# Patient Record
Sex: MALE | URBAN - METROPOLITAN AREA
[De-identification: names, ages, dates, MRNs, and addresses within clinical notes are randomized per-mention and may not be internally consistent; named-entity substitution may affect disease eponyms.]

---

## 2019-08-09 ENCOUNTER — TELEPHONE (OUTPATIENT)
Dept: TRANSPLANT | Facility: CLINIC | Age: 59
End: 2019-08-09

## 2019-08-09 NOTE — TELEPHONE ENCOUNTER
----- Message from Izzy Gaytan sent at 8/9/2019 11:12 AM CDT -----  Regarding: Cirrhosis and Hepatocellular carcinoma referral  Patient being referred for Cirrhosis and Hepatocellular carcinoma. Referral and records scanned into .    Thanks!  Izzy

## 2019-08-14 ENCOUNTER — TELEPHONE (OUTPATIENT)
Dept: TRANSPLANT | Facility: CLINIC | Age: 59
End: 2019-08-14

## 2019-08-14 NOTE — TELEPHONE ENCOUNTER
----- Message from Ingris Townsend sent at 8/14/2019  9:52 AM CDT -----    Called pt sindy [place] office at [phone] and told them that we only have recs on the pt lung mass, nothing about his liver cancer. She will fax recs to [phone]

## 2019-08-21 ENCOUNTER — TELEPHONE (OUTPATIENT)
Dept: TRANSPLANT | Facility: CLINIC | Age: 59
End: 2019-08-21

## 2019-08-21 DIAGNOSIS — K74.69 COMPENSATED HCV CIRRHOSIS: ICD-10-CM

## 2019-08-21 DIAGNOSIS — B19.20 COMPENSATED HCV CIRRHOSIS: ICD-10-CM

## 2019-08-21 NOTE — TELEPHONE ENCOUNTER
Referral received from Dr aTcho Chapman/     Patient with HCV Cirrhosis with abnormal imaging.   ICD-10:  B19.20, K74.69  Referred for liver transplant for CONSULT.    Referral completed and forwarded to [facility].          Insurance:   PRIMARY: Medicare [employer]   ID:[id number]  Contact #     Referring   Dr Tacho Chapman  [facility]  [address] [place] [id number]  PH:  [phone]  Fax [phone]

## 2019-08-21 NOTE — PLAN OF CARE
" (Physician in Lead of Transfers)   Outside Transfer Acceptance Note / Regional Referral Center    Transferring Physician: Dr. Richards, ED    Accepting Physician: Noman Moya MD    Date of Acceptance: 08/21/2019    Transferring Facility: [facility] ED    Reason for Transfer: Higher level of care, hepatology    Report from Transferring Physician/Hospital course:     Mr. 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 His symptoms have been present since February but slowly worsening, though more acutely in the last several days with significantly worsening skin yellowing, distension, and weakness, his most debilitating symptom. No altered mental status. No fevers, chills, or night sweats. He has abdominal pain, but dependent and thought to be from distension. Per Dr. Richards, that patient has no PCP and has has been largely followed up by surgery who ordered a CT A/P in late July that showed multiple hepatic masses, with follow-up MRI showing probable hepatocellular carcinoma on 7/29. Throughout this period he has been attempting to get an appointment with [facility] hepatology. He reports causal alcohol use, drinking "a few beers in the evening," most recently 2.5 weeks ago.    Workup in the ED was significant for profound liver abnormalities (see lab workup below). Transfer was requested for hepatology evaluation. Dr. Bar reviewed and agreed to consult for decompensated cirrhosis.    VS:     Temp: 98.0  HR: 91  RR: 17  SpO2: 94% on RA  BP: 141/78    Labs:     Hemoglobin 12.1  WBC 6.98k  Plt 120k    Na 128  K 3.7  Bicarb 30  sCr 0.17  Total bili 21.8  AST 79    INR 2.  CPK 57  Amylase/lipase 66 and 20  Troponin normal    Radiographs: as above    To Do List:     1. Consult hepatology  2. CMP + INR for MELD determination  3. Diagnostic paracentesis to " rule-out SBP if safe pocket identified on US    Upon patient arrival to floor, please call extension [phone] for Hospital Medicine admit team assignment and for additional admit orders for the patient.  Do not page the attending physician associated with the patient on arrival (this physician may not be on duty at the time of arrival).  Rather, always call [phone] to reach the triage physician for orders and team assignment.    Noman Moya M.D.  Department of Hospital Medicine  [facility]  [phone] (pager)

## 2019-08-22 ENCOUNTER — HOSPITAL ENCOUNTER (INPATIENT)
Facility: HOSPITAL | Age: 59
LOS: 2 days | Discharge: HOSPICE | DRG: 432 | End: 2019-08-24
Attending: INTERNAL MEDICINE | Admitting: HOSPITALIST
Payer: MEDICARE

## 2019-08-22 DIAGNOSIS — Z71.89 GOALS OF CARE, COUNSELING/DISCUSSION: ICD-10-CM

## 2019-08-22 DIAGNOSIS — Z51.5 PALLIATIVE CARE ENCOUNTER: ICD-10-CM

## 2019-08-22 DIAGNOSIS — K72.90 LIVER FAILURE: ICD-10-CM

## 2019-08-22 PROBLEM — B19.20 DECOMPENSATED CIRRHOSIS RELATED TO HEPATITIS C VIRUS (HCV): Status: ACTIVE | Noted: 2019-08-22

## 2019-08-22 PROBLEM — K70.31 ALCOHOLIC CIRRHOSIS OF LIVER WITH ASCITES: Status: ACTIVE | Noted: 2019-08-22

## 2019-08-22 PROBLEM — K74.69 DECOMPENSATED CIRRHOSIS RELATED TO HEPATITIS C VIRUS (HCV): Status: ACTIVE | Noted: 2019-08-22

## 2019-08-22 PROBLEM — C22.0 HCC (HEPATOCELLULAR CARCINOMA): Status: ACTIVE | Noted: 2019-08-22

## 2019-08-22 LAB
AFP SERPL-MCNC: ABNORMAL NG/ML (ref 0–8.4)
ALBUMIN FLD-MCNC: 0.8 G/DL
ALBUMIN SERPL BCP-MCNC: 2.8 G/DL (ref 3.5–5.2)
ALLENS TEST: ABNORMAL
ALP SERPL-CCNC: 126 U/L (ref 55–135)
ALT SERPL W/O P-5'-P-CCNC: 84 U/L (ref 10–44)
AMMONIA PLAS-SCNC: 71 UMOL/L (ref 10–50)
ANION GAP SERPL CALC-SCNC: 11 MMOL/L (ref 8–16)
ANISOCYTOSIS BLD QL SMEAR: SLIGHT
APPEARANCE FLD: CLEAR
AST SERPL-CCNC: 508 U/L (ref 10–40)
BASOPHILS # BLD AUTO: ABNORMAL K/UL (ref 0–0.2)
BASOPHILS NFR BLD: 0 % (ref 0–1.9)
BILIRUB SERPL-MCNC: 22.4 MG/DL (ref 0.1–1)
BODY FLD TYPE: NORMAL
BUN SERPL-MCNC: 10 MG/DL (ref 8–23)
CALCIUM SERPL-MCNC: 9.3 MG/DL (ref 8.7–10.5)
CHLORIDE SERPL-SCNC: 90 MMOL/L (ref 95–110)
CO2 SERPL-SCNC: 30 MMOL/L (ref 23–29)
COLOR FLD: YELLOW
CREAT SERPL-MCNC: 0.8 MG/DL (ref 0.5–1.4)
DELSYS: ABNORMAL
DIFFERENTIAL METHOD: ABNORMAL
EOSINOPHIL # BLD AUTO: ABNORMAL K/UL (ref 0–0.5)
EOSINOPHIL NFR BLD: 0 % (ref 0–8)
ERYTHROCYTE [DISTWIDTH] IN BLOOD BY AUTOMATED COUNT: 16.4 % (ref 11.5–14.5)
ERYTHROCYTE [SEDIMENTATION RATE] IN BLOOD BY WESTERGREN METHOD: 20 MM/H
EST. GFR  (AFRICAN AMERICAN): >60 ML/MIN/1.73 M^2
EST. GFR  (NON AFRICAN AMERICAN): >60 ML/MIN/1.73 M^2
FIO2: 28
FLOW: 2
GLUCOSE SERPL-MCNC: 85 MG/DL (ref 70–110)
HCO3 UR-SCNC: 32.2 MMOL/L (ref 24–28)
HCT VFR BLD AUTO: 33.5 % (ref 40–54)
HGB BLD-MCNC: 12 G/DL (ref 14–18)
HYPOCHROMIA BLD QL SMEAR: ABNORMAL
IMM GRANULOCYTES # BLD AUTO: ABNORMAL K/UL (ref 0–0.04)
IMM GRANULOCYTES NFR BLD AUTO: ABNORMAL % (ref 0–0.5)
INR PPP: 2.6 (ref 0.8–1.2)
LYMPHOCYTES # BLD AUTO: ABNORMAL K/UL (ref 1–4.8)
LYMPHOCYTES NFR BLD: 4 % (ref 18–48)
LYMPHOCYTES NFR FLD MANUAL: 53 %
MCH RBC QN AUTO: 34.8 PG (ref 27–31)
MCHC RBC AUTO-ENTMCNC: 35.8 G/DL (ref 32–36)
MCV RBC AUTO: 97 FL (ref 82–98)
MESOTHL CELL NFR FLD MANUAL: 10 %
MODE: ABNORMAL
MONOCYTES # BLD AUTO: ABNORMAL K/UL (ref 0.3–1)
MONOCYTES NFR BLD: 4 % (ref 4–15)
MONOS+MACROS NFR FLD MANUAL: 27 %
MYELOCYTES NFR BLD MANUAL: 1 %
NEUTROPHILS # BLD AUTO: ABNORMAL K/UL (ref 1.8–7.7)
NEUTROPHILS NFR BLD: 90 % (ref 38–73)
NEUTROPHILS NFR FLD MANUAL: 10 %
NEUTS BAND NFR BLD MANUAL: 1 %
NRBC BLD-RTO: 1 /100 WBC
OVALOCYTES BLD QL SMEAR: ABNORMAL
PCO2 BLDA: 41 MMHG (ref 35–45)
PH SMN: 7.5 [PH] (ref 7.35–7.45)
PLATELET # BLD AUTO: 131 K/UL (ref 150–350)
PMV BLD AUTO: 10.9 FL (ref 9.2–12.9)
PO2 BLDA: 62 MMHG (ref 80–100)
POC BE: 9 MMOL/L
POC SATURATED O2: 93 % (ref 95–100)
POC TCO2: 33 MMOL/L (ref 23–27)
POIKILOCYTOSIS BLD QL SMEAR: SLIGHT
POLYCHROMASIA BLD QL SMEAR: ABNORMAL
POTASSIUM SERPL-SCNC: 3 MMOL/L (ref 3.5–5.1)
PROT FLD-MCNC: 1.4 G/DL
PROT SERPL-MCNC: 7.3 G/DL (ref 6–8.4)
PROTHROMBIN TIME: 25.1 SEC (ref 9–12.5)
RBC # BLD AUTO: 3.45 M/UL (ref 4.6–6.2)
SAMPLE: ABNORMAL
SITE: ABNORMAL
SODIUM SERPL-SCNC: 131 MMOL/L (ref 136–145)
SP02: 89
SPECIMEN SOURCE: NORMAL
SPECIMEN SOURCE: NORMAL
TARGETS BLD QL SMEAR: ABNORMAL
WBC # BLD AUTO: 10.2 K/UL (ref 3.9–12.7)
WBC # FLD: 96 /CU MM

## 2019-08-22 PROCEDURE — 82105 ALPHA-FETOPROTEIN SERUM: CPT | Mod: NTX

## 2019-08-22 PROCEDURE — 25000003 PHARM REV CODE 250: Mod: NTX | Performed by: HOSPITALIST

## 2019-08-22 PROCEDURE — 87040 BLOOD CULTURE FOR BACTERIA: CPT | Mod: NTX

## 2019-08-22 PROCEDURE — 99223 1ST HOSP IP/OBS HIGH 75: CPT | Mod: AI,NTX,, | Performed by: HOSPITALIST

## 2019-08-22 PROCEDURE — 63600175 PHARM REV CODE 636 W HCPCS: Mod: NTX | Performed by: HOSPITALIST

## 2019-08-22 PROCEDURE — 25000003 PHARM REV CODE 250: Mod: NTX | Performed by: PHYSICIAN ASSISTANT

## 2019-08-22 PROCEDURE — 85610 PROTHROMBIN TIME: CPT | Mod: NTX

## 2019-08-22 PROCEDURE — 20600001 HC STEP DOWN PRIVATE ROOM: Mod: NTX

## 2019-08-22 PROCEDURE — 82803 BLOOD GASES ANY COMBINATION: CPT | Mod: NTX

## 2019-08-22 PROCEDURE — P9047 ALBUMIN (HUMAN), 25%, 50ML: HCPCS | Mod: JG,NTX | Performed by: HOSPITALIST

## 2019-08-22 PROCEDURE — 89051 BODY FLUID CELL COUNT: CPT | Mod: NTX

## 2019-08-22 PROCEDURE — 80053 COMPREHEN METABOLIC PANEL: CPT | Mod: NTX

## 2019-08-22 PROCEDURE — 87075 CULTR BACTERIA EXCEPT BLOOD: CPT | Mod: NTX

## 2019-08-22 PROCEDURE — 85027 COMPLETE CBC AUTOMATED: CPT | Mod: NTX

## 2019-08-22 PROCEDURE — 82042 OTHER SOURCE ALBUMIN QUAN EA: CPT | Mod: NTX

## 2019-08-22 PROCEDURE — 36600 WITHDRAWAL OF ARTERIAL BLOOD: CPT | Mod: NTX

## 2019-08-22 PROCEDURE — 99900035 HC TECH TIME PER 15 MIN (STAT): Mod: NTX

## 2019-08-22 PROCEDURE — 85007 BL SMEAR W/DIFF WBC COUNT: CPT | Mod: NTX

## 2019-08-22 PROCEDURE — 80321 ALCOHOLS BIOMARKERS 1OR 2: CPT | Mod: NTX

## 2019-08-22 PROCEDURE — 99223 PR INITIAL HOSPITAL CARE,LEVL III: ICD-10-PCS | Mod: AI,NTX,, | Performed by: HOSPITALIST

## 2019-08-22 PROCEDURE — 82140 ASSAY OF AMMONIA: CPT | Mod: NTX

## 2019-08-22 PROCEDURE — 87070 CULTURE OTHR SPECIMN AEROBIC: CPT | Mod: NTX

## 2019-08-22 PROCEDURE — 84157 ASSAY OF PROTEIN OTHER: CPT | Mod: NTX

## 2019-08-22 PROCEDURE — 36415 COLL VENOUS BLD VENIPUNCTURE: CPT | Mod: NTX

## 2019-08-22 RX ORDER — SODIUM CHLORIDE 0.9 % (FLUSH) 0.9 %
10 SYRINGE (ML) INJECTION
Status: DISCONTINUED | OUTPATIENT
Start: 2019-08-23 | End: 2019-08-24 | Stop reason: HOSPADM

## 2019-08-22 RX ORDER — IBUPROFEN 200 MG
16 TABLET ORAL
Status: DISCONTINUED | OUTPATIENT
Start: 2019-08-23 | End: 2019-08-24 | Stop reason: HOSPADM

## 2019-08-22 RX ORDER — POTASSIUM CHLORIDE 20 MEQ/1
40 TABLET, EXTENDED RELEASE ORAL EVERY 4 HOURS
Status: COMPLETED | OUTPATIENT
Start: 2019-08-22 | End: 2019-08-22

## 2019-08-22 RX ORDER — HYDROXYZINE HYDROCHLORIDE 10 MG/1
25 TABLET, FILM COATED ORAL 3 TIMES DAILY PRN
Status: ON HOLD | COMMUNITY
End: 2019-08-23 | Stop reason: HOSPADM

## 2019-08-22 RX ORDER — ACETAMINOPHEN 325 MG/1
650 TABLET ORAL EVERY 4 HOURS PRN
Status: DISCONTINUED | OUTPATIENT
Start: 2019-08-23 | End: 2019-08-23

## 2019-08-22 RX ORDER — OXYCODONE HYDROCHLORIDE 5 MG/1
5 TABLET ORAL ONCE
Status: COMPLETED | OUTPATIENT
Start: 2019-08-22 | End: 2019-08-22

## 2019-08-22 RX ORDER — ALBUMIN HUMAN 250 G/1000ML
25 SOLUTION INTRAVENOUS ONCE
Status: COMPLETED | OUTPATIENT
Start: 2019-08-22 | End: 2019-08-22

## 2019-08-22 RX ORDER — OXYCODONE HCL 10 MG/1
10 TABLET, FILM COATED, EXTENDED RELEASE ORAL EVERY 12 HOURS PRN
Status: ON HOLD | COMMUNITY
End: 2019-08-23 | Stop reason: HOSPADM

## 2019-08-22 RX ORDER — ONDANSETRON 8 MG/1
8 TABLET, ORALLY DISINTEGRATING ORAL EVERY 8 HOURS PRN
Status: DISCONTINUED | OUTPATIENT
Start: 2019-08-23 | End: 2019-08-24 | Stop reason: HOSPADM

## 2019-08-22 RX ORDER — FUROSEMIDE 10 MG/ML
40 INJECTION INTRAMUSCULAR; INTRAVENOUS 2 TIMES DAILY
Status: DISCONTINUED | OUTPATIENT
Start: 2019-08-22 | End: 2019-08-23

## 2019-08-22 RX ORDER — ACETAMINOPHEN 325 MG/1
650 TABLET ORAL EVERY 6 HOURS PRN
Status: DISCONTINUED | OUTPATIENT
Start: 2019-08-22 | End: 2019-08-22

## 2019-08-22 RX ORDER — IBUPROFEN 200 MG
24 TABLET ORAL
Status: DISCONTINUED | OUTPATIENT
Start: 2019-08-23 | End: 2019-08-24 | Stop reason: HOSPADM

## 2019-08-22 RX ORDER — GLUCAGON 1 MG
1 KIT INJECTION
Status: DISCONTINUED | OUTPATIENT
Start: 2019-08-23 | End: 2019-08-24 | Stop reason: HOSPADM

## 2019-08-22 RX ORDER — SPIRONOLACTONE 100 MG/1
100 TABLET, FILM COATED ORAL DAILY
Status: DISCONTINUED | OUTPATIENT
Start: 2019-08-22 | End: 2019-08-24 | Stop reason: HOSPADM

## 2019-08-22 RX ORDER — TRAMADOL HYDROCHLORIDE 50 MG/1
50 TABLET ORAL EVERY 6 HOURS PRN
Status: DISCONTINUED | OUTPATIENT
Start: 2019-08-22 | End: 2019-08-22

## 2019-08-22 RX ORDER — ACETAMINOPHEN 325 MG/1
325 TABLET ORAL EVERY 4 HOURS PRN
Status: DISCONTINUED | OUTPATIENT
Start: 2019-08-22 | End: 2019-08-23

## 2019-08-22 RX ADMIN — ALBUMIN (HUMAN) 25 G: 25 SOLUTION INTRAVENOUS at 04:08

## 2019-08-22 RX ADMIN — POTASSIUM CHLORIDE 40 MEQ: 20 TABLET, EXTENDED RELEASE ORAL at 10:08

## 2019-08-22 RX ADMIN — OXYCODONE HYDROCHLORIDE 5 MG: 5 TABLET ORAL at 08:08

## 2019-08-22 RX ADMIN — PHYTONADIONE 10 MG: 10 INJECTION, EMULSION INTRAMUSCULAR; INTRAVENOUS; SUBCUTANEOUS at 12:08

## 2019-08-22 RX ADMIN — SPIRONOLACTONE 100 MG: 100 TABLET ORAL at 12:08

## 2019-08-22 RX ADMIN — FUROSEMIDE 40 MG: 10 INJECTION, SOLUTION INTRAMUSCULAR; INTRAVENOUS at 06:08

## 2019-08-22 RX ADMIN — POTASSIUM CHLORIDE 40 MEQ: 20 TABLET, EXTENDED RELEASE ORAL at 02:08

## 2019-08-22 NOTE — PLAN OF CARE
Plan of care reviewed with patient, patient verbalizes understanding. Pt arrived to US for paracentesis. Pt oriented to unit and staff. Comfort measures utilized. Pt safely transferred from stretcher to procedural table. Fall risk reviewed with patient, fall risk interventions maintained. Safety strap applied, positioner pillows utilized to minimize pressure points. Blankets applied. Pt prepped and draped utilizing standard sterile technique. Patient placed on continuous monitoring, as required by sedation policy. Timeouts completed utilizing standard universal time-out, per department and facility policy. RN to remain at bedside, continuous monitoring maintained. Pt resting comfortably. Denies pain/discomfort. Will continue to monitor. See flow sheets for monitoring, medication administration, and updates.

## 2019-08-22 NOTE — PLAN OF CARE
Problem: Adult Inpatient Plan of Care  Goal: Plan of Care Review  Outcome: Ongoing (interventions implemented as appropriate)  Patient admitted from [facility]. Patient had US of liver, chest xray, and paracentesis done. Patient got vitamin K and rocephin IV. Patient got PO potassium for level of 3.0. Patient still scheduled for CT of abdomen.

## 2019-08-22 NOTE — PROGRESS NOTES
Procedure complete. Patient tolerated well. Dressing clean dry and intact to l abdominal area. Patient to transport to room via stretcher via tranport. Report called. Fluid sent to lab as ordered.

## 2019-08-22 NOTE — H&P
Inpatient Radiology Pre-procedure Note    History of Present Illness:  Jim John is a 59 y.o. male who presents for paracentesis.  Admission H&P reviewed.  Past Medical History:   Diagnosis Date    Cancer     Cirrhosis     Hepatitis C      History reviewed. No pertinent surgical history.    Review of Systems:   As documented in primary team H&P    Home Meds:   Prior to Admission medications    Medication Sig Start Date End Date Taking? Authorizing Provider   hydrOXYzine HCl (ATARAX) 10 MG Tab Take 25 mg by mouth 3 (three) times daily as needed.   Yes Historical Provider, MD   oxyCODONE (OXYCONTIN) 10 mg 12 hr tablet Take 10 mg by mouth every 12 (twelve) hours as needed for Pain.   Yes Historical Provider, MD     Scheduled Meds:    furosemide  40 mg Intravenous BID    phytonadione ((AQUA-MEPHYTON) IVPB  10 mg Intravenous Daily    spironolactone  100 mg Oral Daily     Continuous Infusions:   PRN Meds:acetaminophen, iohexol, traMADol  Anticoagulants/Antiplatelets: no anticoagulation    Allergies: Review of patient's allergies indicates:  No Known Allergies  Sedation Hx: have not been any systemic reactions    Labs:  Recent Labs   Lab 08/22/19 0944   INR 2.6*       Recent Labs   Lab 08/22/19 0944   WBC 10.20   HGB 12.0*   HCT 33.5*   MCV 97   *      Recent Labs   Lab 08/22/19 0944   GLU 85   *   K 3.0*   CL 90*   CO2 30*   BUN 10   CREATININE 0.8   CALCIUM 9.3   ALT 84*   *   ALBUMIN 2.8*   BILITOT 22.4*         Vitals:  Temp: 98.3 °F (36.8 °C) (08/22/19 0910)  Pulse: 91 (08/22/19 0910)  Resp: 18 (08/22/19 0910)  BP: (!) 141/68 (08/22/19 0910)  SpO2: (!) 93 % (08/22/19 0910)     Physical Exam:  ASA: 3  Mallampati: 2    General: no acute distress  Mental Status: alert and oriented to person, place and time  HEENT: normocephalic, atraumatic  Chest: unlabored breathing  Heart: regular heart rate  Abdomen: nondistended  Extremity: moves all extremities    Plan: paracentesis  Sedation Plan:  local anesthesia    Rodney Torrez MD  PGY-II Radiology

## 2019-08-22 NOTE — PROCEDURES
Radiology Post-Procedure Note    Pre Op Diagnosis: Ascites  Post Op Diagnosis: Same    Procedure: Paracentesis    Procedure performed by: Rodney Torrez MD, Charan Perry MD    Written Informed Consent Obtained: Yes  Specimen Removed: yes  Estimated Blood Loss: Minimal    Findings:   Successful paracentesis.  Albumin administered PRN per protocol.    Patient tolerated procedure well.    Rodney Torrez MD  PGY-II Radiology

## 2019-08-22 NOTE — TELEPHONE ENCOUNTER
Spoke to pt re referral and need to bring his CD with recent imaging to clinic appt. Pt understands need for ins. Clr. Pt with complaints of blood in his stool, reports red and black. Informed red could be hemroids but black is concerning for upper GI bleed and that requires the ER for assessment since he is at risk for bleeding with liver disease. Pt expressed concerns re liver masses. I informed him that the CT does not give information to state if HCC or not but we will evaluate the actual images and with more blood work we could determine that but he is at risk due to hx of HCV and cirrhosis.  I encouraged pt again to report to the ER for complaints of possible GI bleed that this was the main concern at this time.  Pt lives too far to drive to , informed to report to local ER and they would call for transfer if need of liver specialist. Pt agreed to report to the ER. Spoke with referring office Jessica, informed I was sending the pt to the ER>

## 2019-08-22 NOTE — NURSING
Patient report received from nurse Salazar. Marie got report from [facility]. Patient ambulated to bed. VS taken.

## 2019-08-23 ENCOUNTER — TELEPHONE (OUTPATIENT)
Dept: TRANSPLANT | Facility: CLINIC | Age: 59
End: 2019-08-23

## 2019-08-23 PROBLEM — D63.8 ANEMIA OF CHRONIC DISEASE: Status: ACTIVE | Noted: 2019-08-23

## 2019-08-23 PROBLEM — I81 PORTAL VEIN THROMBOSIS: Status: ACTIVE | Noted: 2019-08-23

## 2019-08-23 PROBLEM — Z72.0 TOBACCO ABUSE: Status: ACTIVE | Noted: 2019-08-23

## 2019-08-23 PROBLEM — E87.1 HYPONATREMIA: Status: ACTIVE | Noted: 2019-08-23

## 2019-08-23 PROBLEM — Z51.5 PALLIATIVE CARE ENCOUNTER: Status: ACTIVE | Noted: 2019-08-23

## 2019-08-23 PROBLEM — E87.6 HYPOKALEMIA: Status: ACTIVE | Noted: 2019-08-23

## 2019-08-23 PROBLEM — E44.0 MODERATE MALNUTRITION: Status: ACTIVE | Noted: 2019-08-23

## 2019-08-23 PROBLEM — D69.6 THROMBOCYTOPENIA: Status: ACTIVE | Noted: 2019-08-23

## 2019-08-23 PROBLEM — R60.1 ANASARCA: Status: ACTIVE | Noted: 2019-08-23

## 2019-08-23 PROBLEM — D68.9 COAGULOPATHY: Status: ACTIVE | Noted: 2019-08-23

## 2019-08-23 LAB
ALBUMIN SERPL BCP-MCNC: 3.1 G/DL (ref 3.5–5.2)
ALP SERPL-CCNC: 150 U/L (ref 55–135)
ALT SERPL W/O P-5'-P-CCNC: 73 U/L (ref 10–44)
ANION GAP SERPL CALC-SCNC: 12 MMOL/L (ref 8–16)
ANISOCYTOSIS BLD QL SMEAR: SLIGHT
AST SERPL-CCNC: 461 U/L (ref 10–40)
BASO STIPL BLD QL SMEAR: ABNORMAL
BASOPHILS # BLD AUTO: ABNORMAL K/UL (ref 0–0.2)
BASOPHILS NFR BLD: 0 % (ref 0–1.9)
BILIRUB SERPL-MCNC: 21.6 MG/DL (ref 0.1–1)
BUN SERPL-MCNC: 11 MG/DL (ref 8–23)
BURR CELLS BLD QL SMEAR: ABNORMAL
CALCIUM SERPL-MCNC: 9.7 MG/DL (ref 8.7–10.5)
CHLORIDE SERPL-SCNC: 88 MMOL/L (ref 95–110)
CO2 SERPL-SCNC: 30 MMOL/L (ref 23–29)
CREAT SERPL-MCNC: 0.7 MG/DL (ref 0.5–1.4)
DIFFERENTIAL METHOD: ABNORMAL
EOSINOPHIL # BLD AUTO: ABNORMAL K/UL (ref 0–0.5)
EOSINOPHIL NFR BLD: 2 % (ref 0–8)
ERYTHROCYTE [DISTWIDTH] IN BLOOD BY AUTOMATED COUNT: 16.6 % (ref 11.5–14.5)
EST. GFR  (AFRICAN AMERICAN): >60 ML/MIN/1.73 M^2
EST. GFR  (NON AFRICAN AMERICAN): >60 ML/MIN/1.73 M^2
FIBRINOGEN PPP-MCNC: 199 MG/DL (ref 182–366)
GLUCOSE SERPL-MCNC: 86 MG/DL (ref 70–110)
HAPTOGLOB SERPL-MCNC: <10 MG/DL (ref 30–250)
HCT VFR BLD AUTO: 31.4 % (ref 40–54)
HGB BLD-MCNC: 10.7 G/DL (ref 14–18)
IMM GRANULOCYTES # BLD AUTO: ABNORMAL K/UL (ref 0–0.04)
IMM GRANULOCYTES NFR BLD AUTO: ABNORMAL % (ref 0–0.5)
INR PPP: 1.8 (ref 0.8–1.2)
LDH SERPL L TO P-CCNC: 321 U/L (ref 110–260)
LYMPHOCYTES # BLD AUTO: ABNORMAL K/UL (ref 1–4.8)
LYMPHOCYTES NFR BLD: 1 % (ref 18–48)
MAGNESIUM SERPL-MCNC: 2 MG/DL (ref 1.6–2.6)
MCH RBC QN AUTO: 34.7 PG (ref 27–31)
MCHC RBC AUTO-ENTMCNC: 34.1 G/DL (ref 32–36)
MCV RBC AUTO: 102 FL (ref 82–98)
MONOCYTES # BLD AUTO: ABNORMAL K/UL (ref 0.3–1)
MONOCYTES NFR BLD: 8 % (ref 4–15)
NEUTROPHILS NFR BLD: 89 % (ref 38–73)
NRBC BLD-RTO: 1 /100 WBC
PHOSPHATE SERPL-MCNC: 2.1 MG/DL (ref 2.7–4.5)
PLATELET # BLD AUTO: 127 K/UL (ref 150–350)
PLATELET BLD QL SMEAR: ABNORMAL
PMV BLD AUTO: 10.3 FL (ref 9.2–12.9)
POIKILOCYTOSIS BLD QL SMEAR: SLIGHT
POLYCHROMASIA BLD QL SMEAR: ABNORMAL
POTASSIUM SERPL-SCNC: 3.2 MMOL/L (ref 3.5–5.1)
PREALB SERPL-MCNC: 8 MG/DL (ref 20–43)
PROT SERPL-MCNC: 7 G/DL (ref 6–8.4)
PROTHROMBIN TIME: 17.3 SEC (ref 9–12.5)
RBC # BLD AUTO: 3.08 M/UL (ref 4.6–6.2)
SODIUM SERPL-SCNC: 130 MMOL/L (ref 136–145)
SPHEROCYTES BLD QL SMEAR: ABNORMAL
WBC # BLD AUTO: 11.1 K/UL (ref 3.9–12.7)

## 2019-08-23 PROCEDURE — 83010 ASSAY OF HAPTOGLOBIN QUANT: CPT | Mod: NTX

## 2019-08-23 PROCEDURE — 25500020 PHARM REV CODE 255: Mod: NTX | Performed by: HOSPITALIST

## 2019-08-23 PROCEDURE — 25000003 PHARM REV CODE 250: Mod: NTX | Performed by: HOSPITALIST

## 2019-08-23 PROCEDURE — 99223 1ST HOSP IP/OBS HIGH 75: CPT | Mod: NTX,,, | Performed by: CLINICAL NURSE SPECIALIST

## 2019-08-23 PROCEDURE — 99233 PR SUBSEQUENT HOSPITAL CARE,LEVL III: ICD-10-PCS | Mod: NTX,,, | Performed by: HOSPITALIST

## 2019-08-23 PROCEDURE — 83615 LACTATE (LD) (LDH) ENZYME: CPT | Mod: NTX

## 2019-08-23 PROCEDURE — 63600175 PHARM REV CODE 636 W HCPCS: Mod: NTX | Performed by: HOSPITALIST

## 2019-08-23 PROCEDURE — 36415 COLL VENOUS BLD VENIPUNCTURE: CPT | Mod: NTX

## 2019-08-23 PROCEDURE — 99223 PR INITIAL HOSPITAL CARE,LEVL III: ICD-10-PCS | Mod: GC,NTX,, | Performed by: INTERNAL MEDICINE

## 2019-08-23 PROCEDURE — 83735 ASSAY OF MAGNESIUM: CPT | Mod: NTX

## 2019-08-23 PROCEDURE — 85384 FIBRINOGEN ACTIVITY: CPT | Mod: NTX

## 2019-08-23 PROCEDURE — 85007 BL SMEAR W/DIFF WBC COUNT: CPT | Mod: NTX

## 2019-08-23 PROCEDURE — 99223 1ST HOSP IP/OBS HIGH 75: CPT | Mod: GC,NTX,, | Performed by: INTERNAL MEDICINE

## 2019-08-23 PROCEDURE — 80053 COMPREHEN METABOLIC PANEL: CPT | Mod: NTX

## 2019-08-23 PROCEDURE — 20600001 HC STEP DOWN PRIVATE ROOM: Mod: NTX

## 2019-08-23 PROCEDURE — 84100 ASSAY OF PHOSPHORUS: CPT | Mod: NTX

## 2019-08-23 PROCEDURE — 85610 PROTHROMBIN TIME: CPT | Mod: NTX

## 2019-08-23 PROCEDURE — 99223 PR INITIAL HOSPITAL CARE,LEVL III: ICD-10-PCS | Mod: NTX,,, | Performed by: CLINICAL NURSE SPECIALIST

## 2019-08-23 PROCEDURE — 85027 COMPLETE CBC AUTOMATED: CPT | Mod: NTX

## 2019-08-23 PROCEDURE — 99233 SBSQ HOSP IP/OBS HIGH 50: CPT | Mod: NTX,,, | Performed by: HOSPITALIST

## 2019-08-23 PROCEDURE — 84134 ASSAY OF PREALBUMIN: CPT | Mod: NTX

## 2019-08-23 RX ORDER — SODIUM,POTASSIUM PHOSPHATES 280-250MG
2 POWDER IN PACKET (EA) ORAL
Status: DISPENSED | OUTPATIENT
Start: 2019-08-23 | End: 2019-08-24

## 2019-08-23 RX ORDER — FUROSEMIDE 40 MG/1
40 TABLET ORAL 2 TIMES DAILY
Status: DISCONTINUED | OUTPATIENT
Start: 2019-08-24 | End: 2019-08-24 | Stop reason: HOSPADM

## 2019-08-23 RX ORDER — ACETAMINOPHEN 325 MG/1
325 TABLET ORAL EVERY 4 HOURS PRN
Status: DISCONTINUED | OUTPATIENT
Start: 2019-08-23 | End: 2019-08-24 | Stop reason: HOSPADM

## 2019-08-23 RX ORDER — OXYCODONE HYDROCHLORIDE 10 MG/1
10 TABLET ORAL EVERY 6 HOURS PRN
Status: DISCONTINUED | OUTPATIENT
Start: 2019-08-23 | End: 2019-08-24 | Stop reason: HOSPADM

## 2019-08-23 RX ORDER — OXYCODONE HYDROCHLORIDE 10 MG/1
10 TABLET ORAL EVERY 6 HOURS PRN
Refills: 0
Start: 2019-08-23 | End: 2019-08-24

## 2019-08-23 RX ORDER — SPIRONOLACTONE 100 MG/1
100 TABLET, FILM COATED ORAL DAILY
Qty: 30 TABLET | Refills: 11
Start: 2019-08-24 | End: 2020-08-23

## 2019-08-23 RX ORDER — FUROSEMIDE 40 MG/1
40 TABLET ORAL 2 TIMES DAILY
Qty: 60 TABLET | Refills: 11
Start: 2019-08-24 | End: 2020-08-23

## 2019-08-23 RX ORDER — OXYCODONE HYDROCHLORIDE 5 MG/1
5 TABLET ORAL EVERY 6 HOURS PRN
Status: DISCONTINUED | OUTPATIENT
Start: 2019-08-23 | End: 2019-08-24 | Stop reason: HOSPADM

## 2019-08-23 RX ORDER — POTASSIUM CHLORIDE 20 MEQ/1
40 TABLET, EXTENDED RELEASE ORAL EVERY 4 HOURS
Status: DISPENSED | OUTPATIENT
Start: 2019-08-23 | End: 2019-08-23

## 2019-08-23 RX ORDER — HYDROXYZINE PAMOATE 25 MG/1
50 CAPSULE ORAL EVERY 8 HOURS PRN
Status: DISCONTINUED | OUTPATIENT
Start: 2019-08-23 | End: 2019-08-24 | Stop reason: HOSPADM

## 2019-08-23 RX ORDER — OXYCODONE HYDROCHLORIDE 5 MG/1
5 TABLET ORAL EVERY 4 HOURS PRN
Status: DISCONTINUED | OUTPATIENT
Start: 2019-08-23 | End: 2019-08-23

## 2019-08-23 RX ADMIN — PHYTONADIONE 10 MG: 10 INJECTION, EMULSION INTRAMUSCULAR; INTRAVENOUS; SUBCUTANEOUS at 08:08

## 2019-08-23 RX ADMIN — SPIRONOLACTONE 100 MG: 100 TABLET ORAL at 08:08

## 2019-08-23 RX ADMIN — OXYCODONE HYDROCHLORIDE 10 MG: 10 TABLET ORAL at 06:08

## 2019-08-23 RX ADMIN — HYDROXYZINE PAMOATE 50 MG: 25 CAPSULE ORAL at 09:08

## 2019-08-23 RX ADMIN — POTASSIUM CHLORIDE 40 MEQ: 20 TABLET, EXTENDED RELEASE ORAL at 09:08

## 2019-08-23 RX ADMIN — IOHEXOL 15 ML: 350 INJECTION, SOLUTION INTRAVENOUS at 03:08

## 2019-08-23 RX ADMIN — FUROSEMIDE 40 MG: 10 INJECTION, SOLUTION INTRAMUSCULAR; INTRAVENOUS at 06:08

## 2019-08-23 RX ADMIN — POTASSIUM & SODIUM PHOSPHATES POWDER PACK 280-160-250 MG 2 PACKET: 280-160-250 PACK at 09:08

## 2019-08-23 RX ADMIN — IOHEXOL 15 ML: 350 INJECTION, SOLUTION INTRAVENOUS at 05:08

## 2019-08-23 RX ADMIN — IOHEXOL 100 ML: 350 INJECTION, SOLUTION INTRAVENOUS at 06:08

## 2019-08-23 RX ADMIN — FUROSEMIDE 40 MG: 10 INJECTION, SOLUTION INTRAMUSCULAR; INTRAVENOUS at 09:08

## 2019-08-23 NOTE — ASSESSMENT & PLAN NOTE
Malnutrition in the context of Chronic Illness/Injury    Related to (etiology):  Inadequate energy intake    Signs and Symptoms (as evidenced by):  Energy Intake: <75% of estimated energy requirement for 1 month  Body Fat Depletion: moderate depletion of orbitals and triceps   Muscle Mass Depletion: moderate depletion of temples, clavicle region and scapular region   Weight Loss: 9% x 3 months  Fluid Accumulation: moderate    Interventions/Recommendations (treatment strategy):  Collaboration of nutrition care w/ other providers     Nutrition Diagnosis Status:  New

## 2019-08-23 NOTE — TELEPHONE ENCOUNTER
..  Patient: Jim John       MRN: [id number]      : 1960     Age: 59 y.o.  [address] [id number]    Provider: Hepatologist Salome Bar    Urgency of review: urgent    Patient Transplant Status: Not a candidate    Reason for presentation: Treatment plan    Clinical Summary:59 year old male with HCV who presents with worsening ascites and jaundice.  Symptoms have been present since February but became more acute and worse over last few days. History of anal squamous cell carcinoma.     Imaging to be reviewed: Abdominal CT scan    HCC Treatment History: None    ABO:   Platelets:   Lab Results   Component Value Date/Time     (L) 2019 05:59 AM     Creatinine:   Lab Results   Component Value Date/Time    CREATININE 0.7 2019 05:59 AM     Bilirubin:   Lab Results   Component Value Date/Time    BILITOT 21.6 (H) 2019 05:59 AM     AFP Last 3 each if available:   Lab Results   Component Value Date/Time    AFP 18764 (H) 2019 09:44 AM       MELD: MELD-Na score: 28 at 2019  5:59 AM  MELD score: 25 at 2019  5:59 AM  Calculated from:  Serum Creatinine: 0.7 mg/dL (Rounded to 1 mg/dL) at 2019  5:59 AM  Serum Sodium: 130 mmol/L at 2019  5:59 AM  Total Bilirubin: 21.6 mg/dL at 2019  5:59 AM  INR(ratio): 1.8 at 2019  5:59 AM  Age: 59 years    Plan:     Follow-up Provider:

## 2019-08-23 NOTE — CONSULTS
[facility]  Hepatology  Consult Note    Patient Name: Jim John  MRN: [id number]  Admission Date: 8/22/2019  Hospital Length of Stay: 1 days  Attending Provider: Rick Cottrell MD   Primary Care Physician: Primary Doctor No  Principal Problem:Decompensated cirrhosis related to hepatitis C virus (HCV)    Inpatient consult to Hepatology  Consult performed by: Maximus Newell MD  Consult ordered by: Rick Cottrell MD        Subjective:     Transplant status: No    HPI:  Patient is a 59-year-old male with past medical history of hepatitis-C, alcohol use, recent diagnosis of cirrhosis distant history of anal squamous cell carcinoma in remission, who presented with worsening jaundice and ascites.    The patient's symptoms started last February.  He was seen at outside hospital, and he had a CT scan of the abdomen that showed multiple hepatic masses consistent with hepatocellular carcinoma (in July 2019).  He was trying to follow up with the hepatology Clinic at [facility].  He continues to drink alcohol the about 2 weeks ago.     On evaluation the ED, he was found to have a total bilirubin of 22.4, AST/ALT at 508/84.  AFP was elevated at [address].  A CT scan of the abdomen was obtained and showed multiple hepatic masses consistent with hepatocellular carcinoma, largest is 5.02 cm in diameter.  He also had multiple pulmonary nodules measuring up to 1.6 cm.    Review of Systems   Constitutional: Positive for activity change, appetite change and fatigue. Negative for fever.   HENT: Negative for trouble swallowing.    Eyes: Negative for visual disturbance.   Respiratory: Negative for cough and shortness of breath.    Cardiovascular: Positive for leg swelling. Negative for chest pain.   Gastrointestinal: Positive for abdominal distention. Negative for abdominal pain, anal bleeding, blood in stool, constipation, diarrhea, nausea and vomiting.   Genitourinary: Negative for flank pain.   Musculoskeletal: Negative for  arthralgias and back pain.   Skin: Positive for color change.   Allergic/Immunologic: Negative for immunocompromised state.   Psychiatric/Behavioral: Negative for confusion.       Past Medical History:   Diagnosis Date    Cancer     Cirrhosis     Hepatitis C        History reviewed. No pertinent surgical history.    Family history of liver disease: No    Review of patient's allergies indicates:  No Known Allergies    Tobacco Use    Smoking status: Former Smoker     Packs/day: 1.50     Last attempt to quit: 8/21/2019    Smokeless tobacco: Former User   Substance and Sexual Activity    Alcohol use: Not Currently     Alcohol/week: 12.6 oz     Types: 21 Cans of beer per week    Drug use: Yes     Types: Marijuana    Sexual activity: Not Currently       Medications Prior to Admission   Medication Sig Dispense Refill Last Dose    hydrOXYzine HCl (ATARAX) 10 MG Tab Take 25 mg by mouth 3 (three) times daily as needed.   8/21/2019 at Unknown time    oxyCODONE (OXYCONTIN) 10 mg 12 hr tablet Take 10 mg by mouth every 12 (twelve) hours as needed for Pain.   8/21/2019 at Unknown time       Objective:     Vital Signs (Most Recent):  Temp: 98.3 °F (36.8 °C) (08/23/19 1110)  Pulse: 86 (08/23/19 1110)  Resp: 17 (08/23/19 1110)  BP: (!) 143/71 (08/23/19 1110)  SpO2: (!) 93 % (08/23/19 1110) Vital Signs (24h Range):  Temp:  [98.3 °F (36.8 °C)-98.5 °F (36.9 °C)] 98.3 °F (36.8 °C)  Pulse:  [86-92] 86  Resp:  [17-19] 17  SpO2:  [88 %-93 %] 93 %  BP: (132-151)/(63-77) 143/71     Weight: 91.3 kg (201 lb 4.5 oz) (08/23/19 1110)  Body mass index is 31.52 kg/m².    Physical Exam   Constitutional: He is oriented to person, place, and time. No distress.   HENT:   Head: Normocephalic.   Eyes: Conjunctivae are normal. Scleral icterus is present.   Neck: Normal range of motion. Neck supple.   Cardiovascular: Normal rate and regular rhythm.   Pulmonary/Chest: Effort normal and breath sounds normal.   Abdominal: Soft. Bowel sounds are  normal. He exhibits distension. He exhibits no mass. There is no tenderness. There is no guarding.   Musculoskeletal: Normal range of motion. He exhibits edema.   Neurological: He is alert and oriented to person, place, and time.   Skin: Skin is warm and dry.   Psychiatric: He has a normal mood and affect.       MELD-Na score: 28 at 8/23/2019  5:59 AM  MELD score: 25 at 8/23/2019  5:59 AM  Calculated from:  Serum Creatinine: 0.7 mg/dL (Rounded to 1 mg/dL) at 8/23/2019  5:59 AM  Serum Sodium: 130 mmol/L at 8/23/2019  5:59 AM  Total Bilirubin: 21.6 mg/dL at 8/23/2019  5:59 AM  INR(ratio): 1.8 at 8/23/2019  5:59 AM  Age: 59 years    Significant Labs:  CBC:   Recent Labs   Lab 08/23/19 0559   WBC 11.10   RBC 3.08*   HGB 10.7*   HCT 31.4*   *     BMP:   Recent Labs   Lab 08/23/19 0559   GLU 86   *   K 3.2*   CL 88*   CO2 30*   BUN 11   CREATININE 0.7   CALCIUM 9.7     CMP:   Recent Labs   Lab 08/23/19  0559   GLU 86   CALCIUM 9.7   ALBUMIN 3.1*   PROT 7.0   *   K 3.2*   CO2 30*   CL 88*   BUN 11   CREATININE 0.7   ALKPHOS 150*   ALT 73*   *   BILITOT 21.6*     Coagulation:   Recent Labs   Lab 08/23/19 0559   INR 1.8*       Significant Imaging:  Labs: Reviewed    Assessment/Plan:     HCC (hepatocellular carcinoma)  Patient is a 59-year-old male with past medical history of hepatitis C and alcohol related cirrhosis, presenting with decompensated cirrhosis.  Imaging of the abdomen is consistent with multiple lesions suggestive of hepatocellular carcinoma.  The patient is not a transplant candidate, and unfortunately due to high bilirubin is not a candidate for local or systemic therapy.  He continued to drink until about 2 weeks ago.  Explained to the patient that he should stop drinking, and if his bilirubin decreases in the future he might be considered for local or systemic therapy.  He understands poor prognosis overall.    -continue diuretics, large volume paracentesis for  comfort.  -palliative care consult  -will discuss imaging at IR conference on Tuesday 8/27        Thank you for your consult. I will sign off. Please contact us if you have any additional questions.    Aftab Newell MD  Hepatology  [facility]

## 2019-08-23 NOTE — CONSULTS
"  [facility]  Adult Nutrition  Consult Note    SUMMARY     Recommendations    1. Continue current Low sodium diet, fluid restriction per MD.   2. Add Boost Plus ONS to aid in caloric intake.   3. RD to monitor & follow-up.    Goals: PO intake >50%  Nutrition Goal Status: new  Communication of RD Recs: reviewed with RN    Reason for Assessment    Reason For Assessment: consult  Diagnosis: other (see comments)(HCV)  Relevant Medical History: Cirrhosis, Ca  Interdisciplinary Rounds: did not attend    General Information Comments: Pt reports improving appetite, consumed 75% of breakfast this AM, tolerating diet. Pt willing to try ONS, was drinking Boost occasionally PTA. PTA, pt reports poor appetite x 1 month & UBW ~ 220# (-9%). NFPE complete, pt noted w/ moderate malnutrition. Please see PES statment for details. Pt follows a general diet at home.  Nutrition Discharge Planning: Adequate PO intake    Nutrition/Diet History    Patient Reported Diet/Restrictions/Preferences: general  Spiritual, Cultural Beliefs, Bahai Practices, Values that Affect Care: no  Factors Affecting Nutritional Intake: decreased appetite    Anthropometrics    Temp: 98.3 °F (36.8 °C)  Height Method: Stated  Height: 5' 7" (170.2 cm)  Height (inches): 67 in  Weight Method: Standard Scale  Weight: 91.3 kg (201 lb 4.5 oz)  Weight (lb): 201.28 lb  Ideal Body Weight (IBW), Male: 148 lb  % Ideal Body Weight, Male (lb): 136 lb  BMI (Calculated): 31.6  BMI Grade: 30 - 34.9- obesity - grade I  Usual Body Weight (UBW), k kg  % Usual Body Weight: 91.49  % Weight Change From Usual Weight: -8.7 %    Lab/Procedures/Meds    Pertinent Labs Reviewed: reviewed  Pertinent Labs Comments: Na 130, Bili 21.6  Pertinent Medications Reviewed: reviewed    Estimated/Assessed Needs    Weight Used For Calorie Calculations: 91.3 kg (201 lb 4.5 oz)     Energy Calorie Requirements (kcal): 2096 kcal/d  Energy Need Method: [place](1.25 PAL) "     Protein Requirements: 110 g/d (1.2 g/kg)  Weight Used For Protein Calculations: 91.3 kg (201 lb 4.5 oz)     Estimated Fluid Requirement Method: other (see comments)(Per MD or 1 mL/kcal)     Nutrition Prescription Ordered    Current Diet Order: Low sodium  Nutrition Order Comments: 1500 mL FR    Evaluation of Received Nutrient/Fluid Intake    Comments: LBM: 8/22    Tolerance: tolerating    Nutrition Risk    Level of Risk/Frequency of Follow-up: (1x/week)     Assessment and Plan    Moderate malnutrition    Malnutrition in the context of Chronic Illness/Injury    Related to (etiology):  Inadequate energy intake    Signs and Symptoms (as evidenced by):  Energy Intake: <75% of estimated energy requirement for 1 month  Body Fat Depletion: moderate depletion of orbitals and triceps   Muscle Mass Depletion: moderate depletion of temples, clavicle region and scapular region   Weight Loss: 9% x 3 months  Fluid Accumulation: moderate    Interventions/Recommendations (treatment strategy):  Collaboration of nutrition care w/ other providers     Nutrition Diagnosis Status:  New     Monitor and Evaluation    Food and Nutrient Intake: energy intake, food and beverage intake  Food and Nutrient Adminstration: diet order  Physical Activity and Function: nutrition-related ADLs and IADLs  Anthropometric Measurements: weight, weight change  Biochemical Data, Medical Tests and Procedures: inflammatory profile, lipid profile, glucose/endocrine profile, gastrointestinal profile, electrolyte and renal panel  Nutrition-Focused Physical Findings: overall appearance     Malnutrition Assessment    Weight Loss (Malnutrition): greater than 7.5% in 3 months  Energy Intake (Malnutrition): less than 75% for greater than or equal to 1 month  Subcutaneous Fat (Malnutrition): moderate depletion  Muscle Mass (Malnutrition): moderate depletion  Fluid Accumulation (Malnutrition): moderate   Orbital Region (Subcutaneous Fat Loss): moderate  depletion  Upper Arm Region (Subcutaneous Fat Loss): moderate depletion   Clavicle Bone Region (Muscle Loss): moderate depletion  Dorsal Hand (Muscle Loss): moderate depletion     Nutrition Follow-Up    RD Follow-up?: Yes

## 2019-08-23 NOTE — PLAN OF CARE
CM received call from Celsa mckeon/ Palliative Care - pt is now DNR and has opted for home services by [facility]. CM updated SW.

## 2019-08-23 NOTE — NURSING
Discussed new orders with pt briefly, including the need to measure urinary output and daily fluid restriction of 1.5L; pt verbalized understanding, no further questions, will continue to monitor

## 2019-08-23 NOTE — HPI
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  Per chart review, his symptoms had been present since February but slowly has been worsening, though more acutely in the last several days with significantly worsening skin yellowing, distension, and weakness, his most debilitating symptom. No altered mental status. No fevers, chills, or night sweats. Per chart review, pt has abdominal pain, but dependent and thought to be from distension. Per chart review, patient has no PCP and has been largely followed up by surgery who ordered a CT A/P in late July that showed multiple hepatic masses, with follow-up MRI showing probable hepatocellular carcinoma on 7/29. Throughout this period he has been attempting to get an appointment with [facility] hepatology. He reports causal alcohol use, drinking "a few beers in the evening," most recently 2.5 weeks ago.       "

## 2019-08-23 NOTE — PLAN OF CARE
SW faxed referral to [facility] via  for review. SW will continue to follow.      08/23/19 2346   Post-Acute Status   Post-Acute Authorization Home Health/Hospice   Post-Acute Placement Status Referrals Sent     Iliana Srinivasan LMSW   - [facility]  Ext. [phone]

## 2019-08-23 NOTE — PLAN OF CARE
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) [B19.20, K74.69]  Yes    Portal vein thrombosis [I81]  Yes    Hyponatremia [E87.1]  Yes    Anasarca [R60.1]  Yes    Coagulopathy [D68.9]  Yes    Hypokalemia [E87.6]  Yes    Anemia of chronic disease [D63.8]  Yes    Thrombocytopenia [D69.6]  Yes    Tobacco abuse [Z72.0]  Yes    Moderate malnutrition [E44.0]  Unknown    Palliative care encounter [Z51.5]  Not Applicable    Goals of care, counseling/discussion [Z71.89]  Not Applicable    HCC (hepatocellular carcinoma) [C22.0]  Yes    Alcoholic cirrhosis of liver with ascites [K70.31]  Yes      Resolved Hospital Problems   No resolved problems to display.       Hospice Qualifying Diagnoses:        Patient has a life expectancy < 6 months due to:  1) Primary Hospice Diagnosis:  Metastatic HCC  2) End stage liver disease and not a liver transplant candidate     Vital Signs: Routine per Hospice Protocol.    Code Status: DNR    Allergies: Review of patient's allergies indicates:  No Known Allergies    Diet: regular     Activities: As tolerated    Nursing: Per Hospice Routine.       Oxygen:  3L NC        Medications:      Jim John   Home Medication Instructions SERENA:[id number]    Printed on:08/23/19 0084   Medication Information                      furosemide (LASIX) 40 MG tablet  Take 1 tablet (40 mg total) by mouth 2 (two) times daily.             oxyCODONE (ROXICODONE) 10 mg Tab immediate release tablet  Take 1 tablet (10 mg total) by mouth every 6 (six) hours as needed.             spironolactone (ALDACTONE) 100 MG tablet  Take 1 tablet (100 mg total) by mouth once daily.                     Future  Orders:  Hospice Medical Director may dictate new orders for comfortable care measures & sign death certificate.        _________________________________  Rick Cottrell MD  08/23/2019

## 2019-08-23 NOTE — SUBJECTIVE & OBJECTIVE
Interval History: Pt to d/c home with hospice.     Past Medical History:   Diagnosis Date    Cancer     Cirrhosis     Hepatitis C        History reviewed. No pertinent surgical history.    Review of patient's allergies indicates:  No Known Allergies    Medications:  Continuous Infusions:  Scheduled Meds:   furosemide  40 mg Intravenous BID    phytonadione ((AQUA-MEPHYTON) IVPB  10 mg Intravenous Daily    potassium chloride SA  40 mEq Oral Q4H    potassium, sodium phosphates  2 packet Oral QID (AC & HS)    spironolactone  100 mg Oral Daily     PRN Meds:acetaminophen, Dextrose 10% Bolus, Dextrose 10% Bolus, glucagon (human recombinant), glucose, glucose, ondansetron, oxyCODONE, oxyCODONE, sodium chloride 0.9%    Family History     Problem Relation (Age of Onset)    Stroke Father        Tobacco Use    Smoking status: Former Smoker     Packs/day: 1.50     Last attempt to quit: 8/21/2019    Smokeless tobacco: Former User   Substance and Sexual Activity    Alcohol use: Not Currently     Alcohol/week: 12.6 oz     Types: 21 Cans of beer per week    Drug use: Yes     Types: Marijuana    Sexual activity: Not Currently       Review of Systems   Constitutional: Positive for activity change and fatigue.   Gastrointestinal: Positive for abdominal distention. Negative for nausea.   Neurological: Positive for weakness.   Psychiatric/Behavioral: Negative.  Negative for confusion.     Objective:     Vital Signs (Most Recent):  Temp: 98.3 °F (36.8 °C) (08/23/19 1110)  Pulse: 86 (08/23/19 1110)  Resp: 17 (08/23/19 1110)  BP: (!) 143/71 (08/23/19 1110)  SpO2: (!) 93 % (08/23/19 1110) Vital Signs (24h Range):  Temp:  [98.3 °F (36.8 °C)-98.5 °F (36.9 °C)] 98.3 °F (36.8 °C)  Pulse:  [86-92] 86  Resp:  [16-19] 17  SpO2:  [88 %-93 %] 93 %  BP: (131-151)/(63-77) 143/71     Weight: 91.3 kg (201 lb 4.5 oz)  Body mass index is 31.52 kg/m².    Review of Symptoms  Symptom Assessment (ESAS 0-10 scale)   ESAS 0 1 2 3 4 5 6 7 8 9 10    Pain              Dyspnea              Anxiety              Nausea              Depression               Anorexia              Fatigue              Insomnia              Restlessness               Agitation              CAM / Delirium __ --  ___+   Constipation     __ --  ___+   Diarrhea           __ --  ___+  Bowel Management Plan (BMP): No      Pain Assessment: Pt has ascites. Pt reports feeling of fullness, uncomfortable. Pt to have peritoneal drain placed.     Performance Status: 30    ECOG Performance Status Grade: 3 - Confined to bed or chair 50% of waking hours    Physical Exam   Constitutional: He is cooperative. He has a sickly appearance.   HENT:   Head: Normocephalic and atraumatic.   Pulmonary/Chest: Effort normal.   Abdominal: Bowel sounds are normal. He exhibits distension and ascites.   Neurological: He is alert.   Skin: Skin is warm and dry.   Jaundiced skin and sclera.    Psychiatric: He has a normal mood and affect. His speech is normal and behavior is normal. Thought content normal.       Significant Labs: All pertinent labs within the past 24 hours have been reviewed.  CBC:   Recent Labs   Lab 08/23/19  0559   WBC 11.10   HGB 10.7*   HCT 31.4*   *   *     BMP:  Recent Labs   Lab 08/23/19  0559   GLU 86   *   K 3.2*   CL 88*   CO2 30*   BUN 11   CREATININE 0.7   CALCIUM 9.7   MG 2.0     LFT:  Lab Results   Component Value Date     (H) 08/23/2019    ALKPHOS 150 (H) 08/23/2019    BILITOT 21.6 (H) 08/23/2019     Albumin:   Albumin   Date Value Ref Range Status   08/23/2019 3.1 (L) 3.5 - 5.2 g/dL Final     Protein:   Total Protein   Date Value Ref Range Status   08/23/2019 7.0 6.0 - 8.4 g/dL Final     Lactic acid:   No results found for: LACTATE    Significant Imaging: I have reviewed all pertinent imaging results/findings within the past 24 hours.    Advance Care Planning   Advanced Directives::  Living Will: No  LaPOST: No  Do Not Resuscitate Status: Yes  Medical Power  of : Pt's sister    Decision-Making Capacity: Patient answered questions, Family answered questions       Living Arrangements: Lives alone in a camper by sister, Kenya.     Psychosocial/Cultural: Pt has never been , no children. Pt has one sister, Kenya and brother, Vinay.  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

## 2019-08-23 NOTE — H&P
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      History of Present Illness:     Mr. 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 His symptoms have been present since February but slowly worsening, though more acutely in the last several days with significantly worsening skin yellowing, distension, and weakness, his most debilitating symptom. No altered mental status. No fevers, chills, or night sweats. He has abdominal pain, but dependent and thought to be from distension. Per Dr. Richards, that patient has no PCP and has has been largely followed up by surgery who ordered a CT A/P in late July that showed multiple hepatic masses, with follow-up MRI showing probable hepatocellular carcinoma on 7/29. Throughout this period he has been attempting to get an appointment with [facility]. He reports causal alcohol use, drinking "a few beers in the evening," most recently 2.5 weeks ago.     Workup in the ED was significant for profound liver abnormalities (see lab workup below). Transfer was requested for hepatology evaluation. Dr. Bar reviewed and agreed to consult for decompensated cirrhosis.    Review of Systems   Constitutional: Negative for chills, fatigue, fever.   HENT: Negative for sore throat, trouble swallowing.    Eyes: Negative for photophobia, visual disturbance.   Respiratory: Negative for cough, shortness of breath.    Cardiovascular: Negative for chest pain, palpitations, leg swelling.   Gastrointestinal: positive for abdominal pain, negative constipation, diarrhea, nausea, vomiting.   Endocrine: " Negative for cold intolerance, heat intolerance.   Genitourinary: Negative for dysuria, frequency.   Musculoskeletal: Negative for arthralgias, myalgias.   Skin: Negative for rash, wound, erythema   Neurological: Negative for dizziness, syncope, weakness, light-headedness.   Psychiatric/Behavioral: Negative for confusion, hallucinations, anxiety  All other systems reviewed and are negative.      Past Medical History: Patient has a past medical history of Cancer, Cirrhosis, and Hepatitis C.    Past Surgical History: Patient has no past surgical history on file.    Social History: Patient reports that he quit smoking 2 days ago. He smoked 1.50 packs per day. He has quit using smokeless tobacco. He reports that he drank about 12.6 oz of alcohol per week. He reports that he has current or past drug history. Drug: Marijuana.    Family History: family history includes Stroke in his father.    Medications: Scheduled Meds:   furosemide  40 mg Intravenous BID    phytonadione ((AQUA-MEPHYTON) IVPB  10 mg Intravenous Daily    spironolactone  100 mg Oral Daily     Continuous Infusions:  PRN Meds:.acetaminophen, acetaminophen, Dextrose 10% Bolus, Dextrose 10% Bolus, glucagon (human recombinant), glucose, glucose, iohexol, ondansetron, sodium chloride 0.9%    Allergies: Patient has No Known Allergies.    Physical Exam:     Vital Signs (Most Recent):  Temp: 98.3 °F (36.8 °C) (08/22/19 2341)  Pulse: 90 (08/22/19 2341)  Resp: 19 (08/22/19 2341)  BP: 132/63 (08/22/19 2341)  SpO2: (!) 93 % (08/22/19 2341) Vital Signs Range (Last 24H):  Temp:  [98.3 °F (36.8 °C)-98.5 °F (36.9 °C)]   Pulse:  [87-91]   Resp:  [16-19]   BP: (131-151)/(63-77)   SpO2:  [92 %-93 %]    Body mass index is 34.43 kg/m².     Physical Exam:  Constitutional: patient appears ill and in slight distress  Head: Normocephalic and atraumatic.   Mouth/Throat: Oropharynx is clear and moist.   Eyes: EOM are normal. Pupils are equal, round, and reactive to light. Positive  scleral icterus.   Neck: Normal range of motion. Neck supple.   Cardiovascular: Normal rate and regular rhythm.  No murmur heard.  Pulmonary/Chest: Effort normal and breath sounds normal. No respiratory distress. No wheezes, rales, or rhonchi  Abdominal: Soft. Bowel sounds are normal.  positive distension, positive TTP  Musculoskeletal: Normal range of motion. No edema.   Neurological: Alert and oriented to person, place, and time.   Skin: Skin is warm and dry.   Psychiatric: Normal mood and affect. Behavior is normal.   Vitals reviewed.    Recent Labs   Lab 08/22/19  0944   WBC 10.20   HGB 12.0*   HCT 33.5*   *       Recent Labs   Lab 08/22/19  0944   *   K 3.0*   CL 90*   CO2 30*   BUN 10   CREATININE 0.8   GLU 85   CALCIUM 9.3     Recent Labs   Lab 08/22/19  0944   ALKPHOS 126   ALT 84*   *   ALBUMIN 2.8*   PROT 7.3   BILITOT 22.4*   INR 2.6*      No results for input(s): POCTGLUCOSE in the last 168 hours.      Assessment and Plan:     Mr. Jim John is a 59 y.o. 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     # Decompensated Hep C and alcohol cirrhosis with ascites  # Severe alcohol dependence   # Anasarca  # Portal Vein thrombosis   MELD-Na score: 31 at 8/22/2019  9:44 AM  MELD score: 29 at 8/22/2019  9:44 AM  Calculated from:  Serum Creatinine: 0.8 mg/dL (Rounded to 1 mg/dL) at 8/22/2019  9:44 AM  Serum Sodium: 131 mmol/L at  8/22/2019  9:44 AM  Total Bilirubin: 22.4 mg/dL at 8/22/2019  9:44 AM  INR(ratio): 2.6 at 8/22/2019  9:44 AM  Age: 59 years  - patient has never been treated for Hep C and has been drinking since he was 15 and just stopped only a few weeks ago when his liver began decompensating; was told that he had some masses on his liver at OSH  - PETH pending  - patient went for IR paracentesis on 8/22 with 5L removed and negative for SBP; patient started on IV lasix and aldactone   - hepatology consulted   - U/S Liver with doppler reviewed; showing large mass 3.2 cm; getting CT ab/pelv with triple phase for further evaluation     # Hepatocellular Carcinoma   - seen on U/S and imaging at outside hospital  - AFP markedly elevated   - getting CT ab/pelv triple phase for further evaluation  - at this point, with patients elevated T.bili does not seem to be a candidate for local or systemic therapy; will await CT report    # Coagulopathy due to liver disease  - vitamin K for 3 days  - DIC labs    # Thrombocytopenia   # Anemia of chronic disease  - monitor     # Hyponatremia  - fluid restrict and diuresis    # Hypokalemia  - replace    # Moderate protein khurram malnutrition  - PAB, ensure; and dietary     # Tobacco abuse  - counseled on cessation     Diet:  Low sodium  GI PPx:    DVT PPx:    Goals of Care:  full      Disposition:  Possibly next week and possible with hospice as patient does not seem to be a liver transplant candidate or a candidate for any systemic or local regional therapy    Rick Cottrell MD  Medical Director [facility] Medicine  Spectra:  [phone]  Pager: [phone]

## 2019-08-23 NOTE — CONSULTS
[facility]  Palliative Medicine  Consult Note    Patient Name: Jim John  MRN: [id number]  Admission Date: 8/22/2019  Hospital Length of Stay: 1 days  Code Status: DNR   Attending Provider: Rick Cottrell MD  Consulting Provider: SRINIVAS Means  Primary Care Physician: No primary care provider on file.  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      Inpatient consult to Palliative Care  Consult performed by: SRINIVAS Resendiz  Consult ordered by: Rick Cottrell MD        Assessment/Plan:     Palliative care encounter  Impression: Pt is a 60 y/o male with severe alcohol dependence (Last drink per pt a few weeks ago) , HCV, HCC. Pt not a liver transplant candidate or systemic or local therapy treatment. Pt has sclera icterus and jaundiced skin. Pt is alert, oriented to person, place, time, and situation.     Reason for consult: Goals of care/hospice    Goals of care: Met with pt and pt's sister, Kalpana. Pt's brother, Vinay and sister-in-law, Susan on the phone. Pt and family verbalized that pt is not a candidate for treatment for his HCC, that he has alcoholic cirrhosis, and ascites. Pt to have peritoneal drain placed per Dr. Cottrell.   Per pt, his goal is comfort, quality of life. Home hospice education discussed. Pt open to home hospice care. Pt's sister Kalpana would like to use [facility] in [place]. Per Kalpana, they have used this agency in past.     Advance directives: MPOA completed with pt. Per pt, first choice is sister Kalpana John, followed by BrotherVinay, and sister-in-law, Susan.     Code Status: Discussed code status with pt. Per pt, he is agreeable to DNR status. Per pt, he would not want CPR or be put on vent.     Pain:    OME in 24 hours is 5.     Pt has oxycodone 5 mg q 6hrs moderate pain.   Pt has oxycodone 10 mg q 6hrs severe  "pain.     Pt reports feeling full and uncomfortable to ABD area.   Per Dr. Cottrell, pt to have peritoneal drain placed.     Called and spoke to Dr. Cottrell concerning above conversations.   Md to make pt DNR.     Plan:   Pt to d/c with home hospice when Dr. Cottrell ready to d/c.   Pt made DNR.   MPOA paperwork completed. See chart.   Support given.  Will follow.         Thank you for your consult. I will follow-up with patient. Please contact us if you have any additional questions.    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  Per chart review, his symptoms had been present since February but slowly has been worsening, though more acutely in the last several days with significantly worsening skin yellowing, distension, and weakness, his most debilitating symptom. No altered mental status. No fevers, chills, or night sweats. Per chart review, pt has abdominal pain, but dependent and thought to be from distension. Per chart review, patient has no PCP and has been largely followed up by surgery who ordered a CT A/P in late July that showed multiple hepatic masses, with follow-up MRI showing probable hepatocellular carcinoma on 7/29. Throughout this period he has been attempting to get an appointment with [facility]. He reports causal alcohol use, drinking "a few beers in the evening," most recently 2.5 weeks ago.         Hospital Course:  No notes on file    Interval History: Pt to d/c home with hospice.     Past Medical History:   Diagnosis Date    Cancer     Cirrhosis     Hepatitis C        History reviewed. No pertinent surgical history.    Review of patient's allergies indicates:  No Known Allergies    Medications:  Continuous Infusions:  Scheduled Meds:   furosemide  40 mg Intravenous BID    phytonadione ((AQUA-MEPHYTON) IVPB  10 mg Intravenous Daily    " potassium chloride SA  40 mEq Oral Q4H    potassium, sodium phosphates  2 packet Oral QID (AC & HS)    spironolactone  100 mg Oral Daily     PRN Meds:acetaminophen, Dextrose 10% Bolus, Dextrose 10% Bolus, glucagon (human recombinant), glucose, glucose, ondansetron, oxyCODONE, oxyCODONE, sodium chloride 0.9%    Family History     Problem Relation (Age of Onset)    Stroke Father        Tobacco Use    Smoking status: Former Smoker     Packs/day: 1.50     Last attempt to quit: 8/21/2019    Smokeless tobacco: Former User   Substance and Sexual Activity    Alcohol use: Not Currently     Alcohol/week: 12.6 oz     Types: 21 Cans of beer per week    Drug use: Yes     Types: Marijuana    Sexual activity: Not Currently       Review of Systems   Constitutional: Positive for activity change and fatigue.   Gastrointestinal: Positive for abdominal distention. Negative for nausea.   Neurological: Positive for weakness.   Psychiatric/Behavioral: Negative.  Negative for confusion.     Objective:     Vital Signs (Most Recent):  Temp: 98.3 °F (36.8 °C) (08/23/19 1110)  Pulse: 86 (08/23/19 1110)  Resp: 17 (08/23/19 1110)  BP: (!) 143/71 (08/23/19 1110)  SpO2: (!) 93 % (08/23/19 1110) Vital Signs (24h Range):  Temp:  [98.3 °F (36.8 °C)-98.5 °F (36.9 °C)] 98.3 °F (36.8 °C)  Pulse:  [86-92] 86  Resp:  [16-19] 17  SpO2:  [88 %-93 %] 93 %  BP: (131-151)/(63-77) 143/71     Weight: 91.3 kg (201 lb 4.5 oz)  Body mass index is 31.52 kg/m².    Review of Symptoms  Symptom Assessment (ESAS 0-10 scale)   ESAS 0 1 2 3 4 5 6 7 8 9 10   Pain              Dyspnea              Anxiety              Nausea              Depression               Anorexia              Fatigue              Insomnia              Restlessness               Agitation              CAM / Delirium __ --  ___+   Constipation     __ --  ___+   Diarrhea           __ --  ___+  Bowel Management Plan (BMP): No      Pain Assessment: Pt has ascites. Pt reports feeling of fullness,  uncomfortable. Pt to have peritoneal drain placed.     Performance Status: 30    ECOG Performance Status Grade: 3 - Confined to bed or chair 50% of waking hours    Physical Exam   Constitutional: He is cooperative. He has a sickly appearance.   HENT:   Head: Normocephalic and atraumatic.   Pulmonary/Chest: Effort normal.   Abdominal: Bowel sounds are normal. He exhibits distension and ascites.   Neurological: He is alert.   Skin: Skin is warm and dry.   Jaundiced skin and sclera.    Psychiatric: He has a normal mood and affect. His speech is normal and behavior is normal. Thought content normal.       Significant Labs: All pertinent labs within the past 24 hours have been reviewed.  CBC:   Recent Labs   Lab 08/23/19  0559   WBC 11.10   HGB 10.7*   HCT 31.4*   *   *     BMP:  Recent Labs   Lab 08/23/19  0559   GLU 86   *   K 3.2*   CL 88*   CO2 30*   BUN 11   CREATININE 0.7   CALCIUM 9.7   MG 2.0     LFT:  Lab Results   Component Value Date     (H) 08/23/2019    ALKPHOS 150 (H) 08/23/2019    BILITOT 21.6 (H) 08/23/2019     Albumin:   Albumin   Date Value Ref Range Status   08/23/2019 3.1 (L) 3.5 - 5.2 g/dL Final     Protein:   Total Protein   Date Value Ref Range Status   08/23/2019 7.0 6.0 - 8.4 g/dL Final     Lactic acid:   No results found for: LACTATE    Significant Imaging: I have reviewed all pertinent imaging results/findings within the past 24 hours.    Advance Care Planning   Advanced Directives::  Living Will: No  LaPOST: No  Do Not Resuscitate Status: Yes  Medical Power of : Pt's sister    Decision-Making Capacity: Patient answered questions, Family answered questions       Living Arrangements: Lives alone in a camper by sister, Kenya.     Psychosocial/Cultural: Pt has never been , no children. Pt has one sister, Kenya and brother, Vinay.  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           > 50% of 70 min visit spent in chart review, face to  face discussion of goals of care,  symptom assessment, coordination of care and emotional support.    Celsa Ware, CNS  Palliative Medicine  [facility]

## 2019-08-23 NOTE — PLAN OF CARE
SW following for D/C needs. SW is in communication with patient's CM, and patient's Care Team - IML. SW will continue to follow.      08/23/19 0853   Post-Acute Status   Post-Acute Authorization Other   Other Status No Post-Acute Service Needs     Iliana Srinivasan LMSW   - [facility]  Ext. [phone]

## 2019-08-23 NOTE — ASSESSMENT & PLAN NOTE
Impression: Pt is a 58 y/o male with severe alcohol dependence (Last drink per pt a few weeks ago) , HCV, HCC. Pt not a liver transplant candidate or systemic or local therapy treatment. Pt has sclera icterus and jaundiced skin. Pt is alert, oriented to person, place, time, and situation.     Reason for consult: Goals of care/hospice    Goals of care: Met with pt and pt's sister, Kalpana. Pt's brother, Vinay and sister-in-law, Susan on the phone. Pt and family verbalized that pt is not a candidate for treatment for his HCC, that he has alcoholic cirrhosis, and ascites. Pt to have peritoneal drain placed per Dr. Cottrell.   Per pt, his goal is comfort, quality of life. Home hospice education discussed. Pt open to home hospice care. Pt's sister Kalpana would like to use [facility] in [place]. Per Kalpana, they have used this agency in past.     Advance directives: MPOA completed with pt. Per pt, first choice is sister Kalpana John, followed by BrotherVinay, and sister-in-law, Susan.     Code Status: Discussed code status with pt. Per pt, he is agreeable to DNR status. Per pt, he would not want CPR or be put on vent.     Pain:    OME in 24 hours is 5.     Pt has oxycodone 5 mg q 6hrs moderate pain.   Pt has oxycodone 10 mg q 6hrs severe pain.     Pt reports feeling full and uncomfortable to ABD area.   Per Dr. Cottrell, pt to have peritoneal drain placed.     Called and spoke to Dr. Cottrell concerning above conversations.   Md to make pt DNR.     Plan:   Pt to d/c with home hospice when Dr. Cottrell ready to d/c.   Pt made DNR.   MPOA paperwork completed. See chart.   Support given.  Will follow.

## 2019-08-23 NOTE — PLAN OF CARE
Problem: Adult Inpatient Plan of Care  Goal: Plan of Care Review  Outcome: Ongoing (interventions implemented as appropriate)  No significant events or changes overnight, A&Ox4, vitals have been stable with prn oxygen still required to maintain saturation >90%, new IV access started in order for CT scan to take place this morning, pt drank oral contrast between 4am and 6am, should be going down for CT scan soon, sister remained at bedside for pt's comfort, provided pt with My Chart tablet and gave brief instructions on how to use it, prn pain med given x1 with moderate effectiveness noted and no further pain or discomfort reported, minimal needs expressed by pt, will continue to monitor

## 2019-08-23 NOTE — PROGRESS NOTES
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)       Subjective:     Interval History/Overnight Events:   Patient's CT triple phase shows infiltrative HCC with PVT and AFP is greater than 50,000; due to patient having a bilirubin of 22, and there being no areas for possible stricture and intervention, patient has no current local or systemic chemotherapy options; patient's sister at the bedside and brother on the phone; told patient likely has less than 3 months to live and patient states that he would like to be be DNR and go home with hospice  - palliative care has been consulted;  - IR states not enough fluid currently to place a palliative drain and patient would likely need that set up by his hospital at home in the next few days once his fluid has built up  - plan for d/c home tomorrow with hospice    Review of Systems   Constitutional: Negative for chills, fatigue, fever.   HENT: Negative for sore throat, trouble swallowing.    Eyes: Negative for photophobia, visual disturbance.   Respiratory: Negative for cough, shortness of breath.    Cardiovascular: Negative for chest pain, palpitations, leg swelling.   Gastrointestinal: Negative for abdominal pain, constipation, diarrhea, nausea, vomiting.   Endocrine: Negative for cold intolerance, heat intolerance.   Genitourinary: Negative for dysuria, frequency.   Musculoskeletal: Negative for arthralgias, myalgias.   Skin: Negative for rash, wound, erythema   Neurological: Negative for dizziness, syncope, weakness, light-headedness.   Psychiatric/Behavioral: Negative for confusion, hallucinations, anxiety  All other systems reviewed and are negative.    Objective:     Temp:  [98.1 °F (36.7 °C)-98.5  °F (36.9 °C)]   Pulse:  [85-92]   Resp:  [16-19]   BP: (132-159)/(63-77)   SpO2:  [88 %-93 %]       Physical Exam:  Constitutional: appears weak and ill  Head: Normocephalic and atraumatic.   Mouth/Throat: Oropharynx is clear and moist.   Eyes: EOM are normal. Pupils are equal, round, and reactive to light. Positive scleral icterus.   Neck: Normal range of motion. Neck supple.   Cardiovascular: Normal rate and regular rhythm.  No murmur heard.  Pulmonary/Chest: Effort normal and breath sounds normal. No respiratory distress. No wheezes, rales, or rhonchi  Abdominal: Soft. Bowel sounds are normal.  positive distension or tenderness  Musculoskeletal: Normal range of motion. Plus 2 pitting edema.   Neurological: Alert and oriented to person, place, and time.   Skin: Skin is warm and dry.   Psychiatric: Normal mood and affect. Behavior is normal.     Recent Labs   Lab 08/22/19  0944 08/23/19  0559   WBC 10.20 11.10   HGB 12.0* 10.7*   HCT 33.5* 31.4*   * 127*     Recent Labs   Lab 08/22/19  0944 08/23/19  0559   * 130*   K 3.0* 3.2*   CL 90* 88*   CO2 30* 30*   BUN 10 11   CREATININE 0.8 0.7   GLU 85 86   CALCIUM 9.3 9.7   MG  --  2.0   PHOS  --  2.1*     Recent Labs   Lab 08/22/19  0944 08/23/19  0559   ALKPHOS 126 150*   ALT 84* 73*   * 461*   ALBUMIN 2.8* 3.1*   PROT 7.3 7.0   BILITOT 22.4* 21.6*   INR 2.6* 1.8*     No results for input(s): POCTGLUCOSE in the last 168 hours.     furosemide  40 mg Intravenous BID    phytonadione ((AQUA-MEPHYTON) IVPB  10 mg Intravenous Daily    potassium, sodium phosphates  2 packet Oral QID (AC & HS)    spironolactone  100 mg Oral Daily       Assessment and Plan     Mr. Jim John is a 59 y.o. male who presented to [facility] on 8/22/2019 with     Hospital Course:    Mr. 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) [B19.20, K74.69]  Yes     Portal vein thrombosis [I81]  Yes    Hyponatremia [E87.1]  Yes    Anasarca [R60.1]  Yes    Coagulopathy [D68.9]  Yes    Hypokalemia [E87.6]  Yes    Anemia of chronic disease [D63.8]  Yes    Thrombocytopenia [D69.6]  Yes    Tobacco abuse [Z72.0]  Yes    Moderate malnutrition [E44.0]  Unknown    Palliative care encounter [Z51.5]  Not Applicable    Goals of care, counseling/discussion [Z71.89]  Not Applicable    HCC (hepatocellular carcinoma) [C22.0]  Yes    Alcoholic cirrhosis of liver with ascites [K70.31]  Yes      Resolved Hospital Problems   No resolved problems to display.     # Decompensated Hep C and alcohol cirrhosis with ascites  # Severe alcohol dependence   # Anasarca  # Portal Vein thrombosis   MELD-Na score: 28 at 8/23/2019  5:59 AM  MELD score: 25 at 8/23/2019  5:59 AM  Calculated from:  Serum Creatinine: 0.7 mg/dL (Rounded to 1 mg/dL) at 8/23/2019  5:59 AM  Serum Sodium: 130 mmol/L at 8/23/2019  5:59 AM  Total Bilirubin: 21.6 mg/dL at 8/23/2019  5:59 AM  INR(ratio): 1.8 at 8/23/2019  5:59 AM  Age: 59 years    - patient has never been treated for Hep C and has been drinking since he was 15 and just stopped only a few weeks ago when his liver began decompensating; was told that he had some masses on his liver at OSH  - PETH pending  - patient went for IR paracentesis on 8/22 with 5L removed and negative for SBP; patient started on IV lasix and aldactone   - hepatology consulted   - U/S Liver with doppler reviewed; showing large mass 3.2 cm;  - CT triple phase shows large 5 cm tumor and diffuse infiltration and with AFP greater than 50,000  - due to patient's bilirubin being 22, there are no local or systemic chemotherapy options for patient and patient would need hospice and would like to go home with hospice;   - IR states not enough fluid currently to place palliative drain, however can get one next week once fluid as built up     # Hepatocellular Carcinoma   - seen on U/S and imaging at  outside hospital  - AFP markedly elevated   - getting CT ab/pelv triple phase for further evaluation  - at this point, with patients elevated T.bili does not seem to be a candidate for local or systemic therapy; will await CT report     # Coagulopathy due to liver disease  - vitamin K for 3 days  - DIC labs     # Thrombocytopenia   # Anemia of chronic disease  - monitor      # Hyponatremia  - fluid restrict and diuresis     # Hypokalemia  - replace     # Moderate protein khurram malnutrition  - PAB, ensure; and dietary      # Tobacco abuse  - counseled on cessation     Diet:  Low sodium  GI PPx:    DVT PPx:    Goals of Care:  DNR        Disposition:  tomorrow with home hospice; unable to place palliative drain due to there not being enough fluid for drain placement;     Rick Cottrell MD  Medical Director [facility]  Spectra:  [phone]  Pager: [phone]

## 2019-08-23 NOTE — PLAN OF CARE
Patient lives alone in a trailer with 1 entry step. Patient and CG sister Kalpana given handouts and forms related to Advanced Directive and Body donation to science. They verbalized understanding and are going to review materials further.        08/23/19 1135   Discharge Assessment   Assessment Type Discharge Planning Assessment   Confirmed/corrected address and phone number on facesheet? Yes   Assessment information obtained from? Patient;Caregiver  (sister)   Expected Length of Stay (days)   (TBD)   Communicated expected length of stay with patient/caregiver yes   Prior to hospitilization cognitive status: Alert/Oriented;No Deficits   Prior to hospitalization functional status: Independent   Current cognitive status: Alert/Oriented;No Deficits   Current Functional Status: Independent   Facility Arrived From: N/A   Lives With alone   Able to Return to Prior Arrangements yes   Is patient able to care for self after discharge? Unable to determine at this time (comments)   Who are your caregiver(s) and their phone number(s)? Kalpana sister [phone]; Vinay gottlieb. [phone]   Patient's perception of discharge disposition home or selfcare   Readmission Within the Last 30 Days no previous admission in last 30 days   Patient currently being followed by outpatient case management? Unable to determine (comments)   Equipment Currently Used at Home none   Do you have any problems affording any of your prescribed medications? No   Is the patient taking medications as prescribed? yes   Does the patient have transportation home? Yes   Transportation Anticipated family or friend will provide   Dialysis Name and Scheduled days N/A   Does the patient receive services at the Coumadin Clinic? No   Discharge Plan A Home Health   Discharge Plan B Hospice/home   DME Needed Upon Discharge  none   Patient/Family in Agreement with Plan yes

## 2019-08-23 NOTE — HPI
Patient is a 59-year-old male with past medical history of hepatitis-C, alcohol use, recent diagnosis of cirrhosis distant history of anal squamous cell carcinoma in remission, who presented with worsening jaundice and ascites.    The patient's symptoms started last February.  He was seen at outside hospital, and he had a CT scan of the abdomen that showed multiple hepatic masses consistent with hepatocellular carcinoma (in July 2019).  He was trying to follow up with the hepatology Clinic at [facility].  He continues to drink alcohol the about 2 weeks ago.     On evaluation the ED, he was found to have a total bilirubin of 22.4, AST/ALT at 508/84.  AFP was elevated at [address].  A CT scan of the abdomen was obtained and showed multiple hepatic masses consistent with hepatocellular carcinoma, largest is 5.02 cm in diameter.  He also had multiple pulmonary nodules measuring up to 1.6 cm.

## 2019-08-23 NOTE — PLAN OF CARE
(KRISTIE) spoke with Jigar at [facility] who confirmed that they have accepted Pt and can initiate services immediately. SW informed Jigar that Pt expected to discharge this weekend and provided her with the contact number for Rubi Prescott, the weekend CM who will be following case.      08/23/19 1650   Post-Acute Status   Post-Acute Authorization Home Health/Hospice   Post-Acute Placement Status Authorization Obtained   Radha Palacios, [facility]  [phone]

## 2019-08-23 NOTE — PLAN OF CARE
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      Primary Language:English   Needed: no      Past Medical Situation:   PMH:   Past Medical History:   Diagnosis Date    Cancer     Cirrhosis     Hepatitis C      Mental Health/Substance Use History: hx of alcoholism  Non-traditional Health practices:     Understanding of diagnosis and prognosis: will benefit from continued support regarding px  Experience/Comfort level with health care system:    Patients Mental Status: alert and oriented    Socio-Economic Factors/Resources:  Address: [address] [place] [address]  Phone Number: [phone] (home)     Marital Status: Single  Household Composition: lives with sister  Children: none  Relationships with Family:  Support from his siblings. Pt has one sister Kalpana and one brother Vinay. Supportive family. Nieces and nephews.    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   History: no    Financial Resources:[place] medicare and BCBS      Advanced Care Planning & Legal Concerns:   Advanced Directives/Living Will: no   Planning:  no    Power  of : yes Kalpana and 2nd Mclean  Surrogate Decision Maker:       Spirituality, Culture & Coping Mechanisms:  F- Farheen and Belief: Unknown     I - Importance: has farheen    C - Community/Culture Values:     A - Address in Care: spiritual care to follow    Strengths/Coping Strategies: family support  Self-Care Activities/Hobbies: family time    Goals/Hopes/Expectations: wants to go home  Fears/Anxiety/Concerns: financial concerns regarding  expenses        Preferences about EOL Environment: (own bed, family nearby, pets, music, etc)  Home with sister    Complicated Bereavement Risk Assessment Tool (CBRAT)  Reference:  [facility] Clinical Practice Group (May 2016). Bereavement Risk Screening and Management Guidelines.  Retrieved from: [email or web address]/wp-content/uploads//KKFDG-Cnwjhwqxxhb-Arwcoczyw-and-Management-Guideline-2016.pdf      Bereaved Client Characteristics   ? Under 18      no  ? Was a Twin   no  ? Young Spouse   no  ? Elderly Spouse    no  ? Isolated    no  ? Lacks Meaningful Social Support   no  ? Dissatisfied with help available during illness   no  ? New to Financial [place] no  ? New to Decision-Making   no    Illness  ? Inherited Disorder   no  ? Stigmatized Disease in the family/community   no  ? Lengthy/Burdensome   no     Bereaved Client's History of Loss   ? Cumulative Multiple Losses   no  ? Previous Mental Health Illnesses   no  ? Current Mental Health Illness   no  ? Other Significant Health Issues   no   ? Migrant/Refugee   no Death  ? Sudden or Unexpected   no  ? Traumatic Circumstances Associated with Death   no  ? Significant Cultural/Social Burdens as a result of Death   no   Relationship with   ? Profound Lifelong Partner   no  ? Highly Dependent    no  ? Antagonistic   no  ? Ambivalent   no  ? Deeply Connected   no  ? Culturally Defined   no   Risk Factors Scores  0-2  Low  3-5  Moderate  5+  High  All  persons scoring moderate to high presume to be at risk**    (** It is acknowledged that protective factors and resilience may outweigh apparent risk factors.      Total Risk Factors Score:   Mild to moderate    Will benefit from follow up.      Discharge Planning Needs/Plan of Care:     Visit to bedside. Spoke with pt and Sister regarding request for Donation of Body. Encouraged to complete form but explained that since pt has Hepatitis, he will not meet criteria for body donation. Explained that Hospice will be able to help with finances for funerals.  Offered calling  as well.    Emotional Support provided.          Kathy Kellogg, CECILIAW, ACHP-SW

## 2019-08-23 NOTE — SUBJECTIVE & OBJECTIVE
Review of Systems   Constitutional: Positive for activity change, appetite change and fatigue. Negative for fever.   HENT: Negative for trouble swallowing.    Eyes: Negative for visual disturbance.   Respiratory: Negative for cough and shortness of breath.    Cardiovascular: Positive for leg swelling. Negative for chest pain.   Gastrointestinal: Positive for abdominal distention. Negative for abdominal pain, anal bleeding, blood in stool, constipation, diarrhea, nausea and vomiting.   Genitourinary: Negative for flank pain.   Musculoskeletal: Negative for arthralgias and back pain.   Skin: Positive for color change.   Allergic/Immunologic: Negative for immunocompromised state.   Psychiatric/Behavioral: Negative for confusion.       Past Medical History:   Diagnosis Date    Cancer     Cirrhosis     Hepatitis C        History reviewed. No pertinent surgical history.    Family history of liver disease: No    Review of patient's allergies indicates:  No Known Allergies    Tobacco Use    Smoking status: Former Smoker     Packs/day: 1.50     Last attempt to quit: 8/21/2019    Smokeless tobacco: Former User   Substance and Sexual Activity    Alcohol use: Not Currently     Alcohol/week: 12.6 oz     Types: 21 Cans of beer per week    Drug use: Yes     Types: Marijuana    Sexual activity: Not Currently       Medications Prior to Admission   Medication Sig Dispense Refill Last Dose    hydrOXYzine HCl (ATARAX) 10 MG Tab Take 25 mg by mouth 3 (three) times daily as needed.   8/21/2019 at Unknown time    oxyCODONE (OXYCONTIN) 10 mg 12 hr tablet Take 10 mg by mouth every 12 (twelve) hours as needed for Pain.   8/21/2019 at Unknown time       Objective:     Vital Signs (Most Recent):  Temp: 98.3 °F (36.8 °C) (08/23/19 1110)  Pulse: 86 (08/23/19 1110)  Resp: 17 (08/23/19 1110)  BP: (!) 143/71 (08/23/19 1110)  SpO2: (!) 93 % (08/23/19 1110) Vital Signs (24h Range):  Temp:  [98.3 °F (36.8 °C)-98.5 °F (36.9 °C)] 98.3 °F (36.8  °C)  Pulse:  [86-92] 86  Resp:  [17-19] 17  SpO2:  [88 %-93 %] 93 %  BP: (132-151)/(63-77) 143/71     Weight: 91.3 kg (201 lb 4.5 oz) (08/23/19 1110)  Body mass index is 31.52 kg/m².    Physical Exam   Constitutional: He is oriented to person, place, and time. No distress.   HENT:   Head: Normocephalic.   Eyes: Conjunctivae are normal. Scleral icterus is present.   Neck: Normal range of motion. Neck supple.   Cardiovascular: Normal rate and regular rhythm.   Pulmonary/Chest: Effort normal and breath sounds normal.   Abdominal: Soft. Bowel sounds are normal. He exhibits distension. He exhibits no mass. There is no tenderness. There is no guarding.   Musculoskeletal: Normal range of motion. He exhibits edema.   Neurological: He is alert and oriented to person, place, and time.   Skin: Skin is warm and dry.   Psychiatric: He has a normal mood and affect.       MELD-Na score: 28 at 8/23/2019  5:59 AM  MELD score: 25 at 8/23/2019  5:59 AM  Calculated from:  Serum Creatinine: 0.7 mg/dL (Rounded to 1 mg/dL) at 8/23/2019  5:59 AM  Serum Sodium: 130 mmol/L at 8/23/2019  5:59 AM  Total Bilirubin: 21.6 mg/dL at 8/23/2019  5:59 AM  INR(ratio): 1.8 at 8/23/2019  5:59 AM  Age: 59 years    Significant Labs:  CBC:   Recent Labs   Lab 08/23/19  0559   WBC 11.10   RBC 3.08*   HGB 10.7*   HCT 31.4*   *     BMP:   Recent Labs   Lab 08/23/19  0559   GLU 86   *   K 3.2*   CL 88*   CO2 30*   BUN 11   CREATININE 0.7   CALCIUM 9.7     CMP:   Recent Labs   Lab 08/23/19  0559   GLU 86   CALCIUM 9.7   ALBUMIN 3.1*   PROT 7.0   *   K 3.2*   CO2 30*   CL 88*   BUN 11   CREATININE 0.7   ALKPHOS 150*   ALT 73*   *   BILITOT 21.6*     Coagulation:   Recent Labs   Lab 08/23/19  0559   INR 1.8*       Significant Imaging:  Labs: Reviewed

## 2019-08-23 NOTE — ASSESSMENT & PLAN NOTE
Patient is a 59-year-old male with past medical history of hepatitis C and alcohol related cirrhosis, presenting with decompensated cirrhosis.  Imaging of the abdomen is consistent with multiple lesions suggestive of hepatocellular carcinoma.  The patient is not a transplant candidate, and unfortunately due to high bilirubin is not a candidate for local or systemic therapy.  He continued to drink until about 2 weeks ago.  Explained to the patient that he should stop drinking, and if his bilirubin decreases in the future he might be considered for local or systemic therapy.  He understands poor prognosis overall.    -continue diuretics, large volume paracentesis for comfort.  -palliative care consult  -will discuss imaging at IR conference on Tuesday 8/27

## 2019-08-24 VITALS
TEMPERATURE: 98 F | OXYGEN SATURATION: 86 % | HEART RATE: 89 BPM | WEIGHT: 201.25 LBS | RESPIRATION RATE: 18 BRPM | DIASTOLIC BLOOD PRESSURE: 70 MMHG | BODY MASS INDEX: 31.59 KG/M2 | HEIGHT: 67 IN | SYSTOLIC BLOOD PRESSURE: 144 MMHG

## 2019-08-24 PROCEDURE — 63600175 PHARM REV CODE 636 W HCPCS: Mod: NTX | Performed by: HOSPITALIST

## 2019-08-24 PROCEDURE — 99239 PR HOSPITAL DISCHARGE DAY,>30 MIN: ICD-10-PCS | Mod: NTX,,, | Performed by: HOSPITALIST

## 2019-08-24 PROCEDURE — 99239 HOSP IP/OBS DSCHRG MGMT >30: CPT | Mod: NTX,,, | Performed by: HOSPITALIST

## 2019-08-24 PROCEDURE — 25000003 PHARM REV CODE 250: Mod: NTX | Performed by: HOSPITALIST

## 2019-08-24 RX ORDER — OXYCODONE HYDROCHLORIDE 10 MG/1
10 TABLET ORAL EVERY 6 HOURS PRN
Qty: 10 TABLET | Refills: 0 | Status: SHIPPED | OUTPATIENT
Start: 2019-08-24

## 2019-08-24 RX ADMIN — POTASSIUM & SODIUM PHOSPHATES POWDER PACK 280-160-250 MG 2 PACKET: 280-160-250 PACK at 05:08

## 2019-08-24 RX ADMIN — OXYCODONE HYDROCHLORIDE 10 MG: 10 TABLET ORAL at 12:08

## 2019-08-24 RX ADMIN — SPIRONOLACTONE 100 MG: 100 TABLET ORAL at 09:08

## 2019-08-24 RX ADMIN — FUROSEMIDE 40 MG: 40 TABLET ORAL at 09:08

## 2019-08-24 RX ADMIN — PHYTONADIONE 10 MG: 10 INJECTION, EMULSION INTRAMUSCULAR; INTRAVENOUS; SUBCUTANEOUS at 09:08

## 2019-08-24 NOTE — PLAN OF CARE
Problem: Adult Inpatient Plan of Care  Goal: Plan of Care Review  Outcome: Ongoing (interventions implemented as appropriate)  No significant events overnight, pt A&Ox4, reported anxiety/frustration at start of shift related to prognosis that he received earlier in the day, prn med given for anxiety (hydroxyzine) with moderate relief noted and pt able to sleep throughout most of the night, vitals stable, prn med given at pt request, minimal needs expressed, anticipating discharge later this morning/home with hospice care, sister at bedside overnight for pt's comfort, will continue to monitor

## 2019-08-24 NOTE — PLAN OF CARE
IQRA received notice from Dr. Cottrell of patient's pending discharge with home hospice.  Patient will travel home with his sister, but lives four hours away and will require O2 for the drive.  [facility] previously consulted for patient.  CM called [facility] [phone] and paged the on call nurse for assistance.  Awaiting call back.    9:20 AM  CM called [facility] back due to no call back from on call nurse, Cheryl.  [facility]  states it is not her on call but a Jennifer on call.  She will get message to Shareablee and have her call CM back.    9:40 AM  CM spoke to Jennifer with [facility] and informed of patient to go home today.  IQRA also informed Jennifer that patient needs O2 for home transport.  Jennifer states she is not sure if they provide transport O2 but will find out and call CM back.    9:55 AM  CM received call from Shareablee with [facility] with DME on the phone with her asking how many liters the patient needs for the transport home.  IQRA explained patient is on 2L/NC.  Jennifer states they are not sure how many tanks the patient will need but will send him with three tanks for the transport.  Jennifer states DME will call CM when they are going to deliver the O2.

## 2019-08-24 NOTE — NURSING
All LDA's removed, patient tolerated well. Discharge instructions reviewed with patient and his sister.

## 2019-08-24 NOTE — PLAN OF CARE
Problem: Adult Inpatient Plan of Care  Goal: Plan of Care Review  Outcome: Ongoing (interventions implemented as appropriate)  Patient AAOx4 VS stable. POC reviewed with patient and his sister Myrtle. Patient became frustrated after her received news about his prognosis. Safety maintained throughout shift, no acute changes. WCTM.

## 2019-08-24 NOTE — DISCHARGE SUMMARY
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) [B19.20, K74.69]  Yes    Portal vein thrombosis [I81]  Yes    Hyponatremia [E87.1]  Yes    Anasarca [R60.1]  Yes    Coagulopathy [D68.9]  Yes    Hypokalemia [E87.6]  Yes    Anemia of chronic disease [D63.8]  Yes    Thrombocytopenia [D69.6]  Yes    Tobacco abuse [Z72.0]  Yes    Moderate malnutrition [E44.0]  Unknown    Palliative care encounter [Z51.5]  Not Applicable    Goals of care, counseling/discussion [Z71.89]  Not Applicable    HCC (hepatocellular carcinoma) [C22.0]  Yes    Alcoholic cirrhosis of liver with ascites [K70.31]  Yes      Resolved Hospital Problems   No resolved problems to display.       History of Present Illness:      Mr. 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 His symptoms have been present since February but slowly worsening, though more acutely in the last several days with significantly worsening skin yellowing, distension, and weakness, his most debilitating symptom. No altered mental status. No fevers, chills, or night sweats. He has abdominal pain, but dependent and thought to be from distension. Per Dr. Richards, that patient has no PCP and has has been largely followed up by surgery who ordered a CT A/P in late July that showed multiple hepatic masses, with follow-up MRI showing probable hepatocellular carcinoma on 7/29. Throughout this period he has been attempting to get an appointment with [facility] hepatology. He reports  "causal alcohol use, drinking "a few beers in the evening," most recently 2.5 weeks ago.     Workup in the ED was significant for profound liver abnormalities (see lab workup below). Transfer was requested for hepatology evaluation. Dr. Bar reviewed and agreed to consult for decompensated cirrhosis.       Hospital Course of Principle Problem Addressed:       # Decompensated Hep C and alcohol cirrhosis with ascites  # Severe alcohol dependence   # Anasarca  # Portal Vein thrombosis   MELD-Na score: 28 at 8/23/2019  5:59 AM  MELD score: 25 at 8/23/2019  5:59 AM  Calculated from:  Serum Creatinine: 0.7 mg/dL (Rounded to 1 mg/dL) at 8/23/2019  5:59 AM  Serum Sodium: 130 mmol/L at 8/23/2019  5:59 AM  Total Bilirubin: 21.6 mg/dL at 8/23/2019  5:59 AM  INR(ratio): 1.8 at 8/23/2019  5:59 AM  Age: 59 years     - patient has never been treated for Hep C and has been drinking since he was 15 and just stopped only a few weeks ago when his liver began decompensating; was told that he had some masses on his liver at OSH  - PETH pending  - patient went for IR paracentesis on 8/22 with 5L removed and negative for SBP; patient started on IV lasix and aldactone   - hepatology consulted   - U/S Liver with doppler reviewed; showing large mass 3.2 cm;  - CT triple phase shows large 5 cm tumor and diffuse infiltration and with AFP greater than 50,000  - due to patient's bilirubin being 22, there are no local or systemic chemotherapy options for patient and patient would need hospice and would like to go home with hospice;   - IR states not enough fluid currently to place palliative drain, however can get one next week once fluid as built up     # Hepatocellular Carcinoma   - seen on U/S and imaging at outside hospital  - AFP markedly elevated   - getting CT ab/pelv triple phase for further evaluation  - at this point, with patients elevated T.bili does not seem to be a candidate for local or systemic therapy        # Coagulopathy due to " liver disease  - vitamin K for 3 days  - DIC labs     # Thrombocytopenia   # Anemia of chronic disease  - monitor      # Hyponatremia  - fluid restrict and diuresis     # Hypokalemia  - replace     # Moderate protein khurram malnutrition  - PAB, ensure; and dietary      # Tobacco abuse  - counseled on cessation     Diet:  Low sodium  GI PPx:    DVT PPx:    Goals of Care:  DNR        Disposition:  today with home hospice; unable to place palliative drain due to there not being enough fluid for drain placement;      Rick Cottrell MD  Medical Director [facility] Medicine  Spectra:  [phone]  Pager: [phone]      Other Medical Problems Addressed in the Hospital:         Significant Diagnostic Tests/Imaging:     Recent Labs   Lab 08/22/19  0944 08/23/19  0559   WBC 10.20 11.10   HGB 12.0* 10.7*   HCT 33.5* 31.4*   * 127*     Recent Labs   Lab 08/22/19  0944 08/23/19  0559   * 130*   K 3.0* 3.2*   CL 90* 88*   CO2 30* 30*   BUN 10 11   CREATININE 0.8 0.7   CALCIUM 9.3 9.7   MG  --  2.0   PHOS  --  2.1*   PROT 7.3 7.0   BILITOT 22.4* 21.6*   ALKPHOS 126 150*   ALT 84* 73*   * 461*         Special Treatments/Procedures:         Discharge Medications:      Discharge Medication List as of 8/24/2019 10:53 AM      START taking these medications    Details   furosemide (LASIX) 40 MG tablet Take 1 tablet (40 mg total) by mouth 2 (two) times daily., Starting Sat 8/24/2019, Until Sun 8/23/2020, No Print      spironolactone (ALDACTONE) 100 MG tablet Take 1 tablet (100 mg total) by mouth once daily., Starting Sat 8/24/2019, Until Sun 8/23/2020, No Print         CONTINUE these medications which have CHANGED    Details   oxyCODONE (ROXICODONE) 10 mg Tab immediate release tablet Take 1 tablet (10 mg total) by mouth every 6 (six) hours as needed., Starting Sat 8/24/2019, Print         STOP taking these medications       hydrOXYzine HCl (ATARAX) 10 MG Tab Comments:   Reason for Stopping:         oxyCODONE (OXYCONTIN)  10 mg 12 hr tablet Comments:   Reason for Stopping:               Discharge Diet:2 gram sodium diet    Activity: activity as tolerated    Discharge Condition: Critical    Disposition: Hospice/Home    Time spent on the discharge of the patient including review of hospital course with the patient. reviewing discharge medications and arranging follow-up care 35 minutes.  Patient was seen and examined on the date of discharge and determined to be suitable for discharge.    No future appointments.    Rick Cottrell MD  Medical Director [facility] Medicine  Spectra:  [phone]  Pager: [phone]

## 2019-08-24 NOTE — PROGRESS NOTES
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   HENT: Negative for sore throat, trouble swallowing.    Eyes: Negative for photophobia, visual disturbance.   Respiratory: Negative for cough, shortness of breath.    Cardiovascular: Negative for chest pain, palpitations, leg swelling.   Gastrointestinal: Negative for abdominal pain, constipation, diarrhea, nausea, vomiting.   Endocrine: Negative for cold intolerance, heat intolerance.   Genitourinary: Negative for dysuria, frequency.   Musculoskeletal: Negative for arthralgias, myalgias.   Skin: Negative for rash, wound, erythema   Neurological: Negative for dizziness, syncope, weakness, light-headedness.   Psychiatric/Behavioral: Negative for confusion, hallucinations, anxiety  All other systems reviewed and are negative.    Objective:     Temp:  [98.1 °F (36.7 °C)-98.5 °F (36.9 °C)]   Pulse:  [85-93]   Resp:  [16-18]   BP: (137-159)/(63-76)   SpO2:  [86 %-93 %]       Physical Exam:  Constitutional: appears weak and ill  Head: Normocephalic and atraumatic.   Mouth/Throat: Oropharynx is clear and moist.   Eyes: EOM are normal. Pupils are equal, round, and reactive to light. Positive scleral icterus.   Neck: Normal range of motion. Neck supple.   Cardiovascular: Normal rate and regular rhythm.  No murmur heard.  Pulmonary/Chest: Effort normal and breath sounds normal. No respiratory distress. No wheezes, rales, or  rhonchi  Abdominal: Soft. Bowel sounds are normal.  positive distension or tenderness  Musculoskeletal: Normal range of motion. Plus 2 pitting edema.   Neurological: Alert and oriented to person, place, and time.   Skin: Skin is warm and dry.   Psychiatric: Normal mood and affect. Behavior is normal.     Recent Labs   Lab 08/22/19  0944 08/23/19  0559   WBC 10.20 11.10   HGB 12.0* 10.7*   HCT 33.5* 31.4*   * 127*     Recent Labs   Lab 08/22/19  0944 08/23/19  0559   * 130*   K 3.0* 3.2*   CL 90* 88*   CO2 30* 30*   BUN 10 11   CREATININE 0.8 0.7   GLU 85 86   CALCIUM 9.3 9.7   MG  --  2.0   PHOS  --  2.1*     Recent Labs   Lab 08/22/19 0944 08/23/19  0559   ALKPHOS 126 150*   ALT 84* 73*   * 461*   ALBUMIN 2.8* 3.1*   PROT 7.3 7.0   BILITOT 22.4* 21.6*   INR 2.6* 1.8*     No results for input(s): POCTGLUCOSE in the last 168 hours.     furosemide  40 mg Oral BID    spironolactone  100 mg Oral Daily       Assessment and Plan     Mr. Jim John is a 59 y.o. male who presented to [facility] on 8/22/2019 with     Hospital Course:    Mr. 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) [B19.20, K74.69]  Yes    Portal vein thrombosis [I81]  Yes    Hyponatremia [E87.1]  Yes    Anasarca [R60.1]  Yes    Coagulopathy [D68.9]  Yes    Hypokalemia [E87.6]  Yes    Anemia of chronic disease [D63.8]  Yes    Thrombocytopenia [D69.6]  Yes    Tobacco abuse [Z72.0]  Yes    Moderate malnutrition [E44.0]  Unknown    Palliative care encounter [Z51.5]  Not Applicable    Goals of care, counseling/discussion [Z71.89]  Not Applicable    HCC (hepatocellular carcinoma) [C22.0]  Yes    Alcoholic cirrhosis of liver with ascites [K70.31]  Yes      Resolved Hospital Problems   No resolved problems to display.     # Decompensated Hep C and alcohol cirrhosis with ascites  # Severe alcohol  dependence   # Anasarca  # Portal Vein thrombosis   MELD-Na score: 28 at 8/23/2019  5:59 AM  MELD score: 25 at 8/23/2019  5:59 AM  Calculated from:  Serum Creatinine: 0.7 mg/dL (Rounded to 1 mg/dL) at 8/23/2019  5:59 AM  Serum Sodium: 130 mmol/L at 8/23/2019  5:59 AM  Total Bilirubin: 21.6 mg/dL at 8/23/2019  5:59 AM  INR(ratio): 1.8 at 8/23/2019  5:59 AM  Age: 59 years    - patient has never been treated for Hep C and has been drinking since he was 15 and just stopped only a few weeks ago when his liver began decompensating; was told that he had some masses on his liver at OSH  - PETH pending  - patient went for IR paracentesis on 8/22 with 5L removed and negative for SBP; patient started on IV lasix and aldactone   - hepatology consulted   - U/S Liver with doppler reviewed; showing large mass 3.2 cm;  - CT triple phase shows large 5 cm tumor and diffuse infiltration and with AFP greater than 50,000  - due to patient's bilirubin being 22, there are no local or systemic chemotherapy options for patient and patient would need hospice and would like to go home with hospice;   - IR states not enough fluid currently to place palliative drain, however can get one next week once fluid as built up     # Hepatocellular Carcinoma   - seen on U/S and imaging at outside hospital  - AFP markedly elevated   - getting CT ab/pelv triple phase for further evaluation  - at this point, with patients elevated T.bili does not seem to be a candidate for local or systemic therapy      # Coagulopathy due to liver disease  - vitamin K for 3 days  - DIC labs     # Thrombocytopenia   # Anemia of chronic disease  - monitor      # Hyponatremia  - fluid restrict and diuresis     # Hypokalemia  - replace     # Moderate protein khurram malnutrition  - PAB, ensure; and dietary      # Tobacco abuse  - counseled on cessation     Diet:  Low sodium  GI PPx:    DVT PPx:    Goals of Care:  DNR        Disposition:  today with home hospice; unable to place  palliative drain due to there not being enough fluid for drain placement;     Rick Cottrell MD  Medical Director [facility] Medicine  Spectra:  [phone]  Pager: [phone]

## 2019-08-26 ENCOUNTER — TELEPHONE (OUTPATIENT)
Dept: TRANSPLANT | Facility: CLINIC | Age: 59
End: 2019-08-26

## 2019-08-26 LAB — BACTERIA SPEC AEROBE CULT: NO GROWTH

## 2019-08-26 NOTE — TELEPHONE ENCOUNTER
Called referring Dr Chapman, spoke with Jessica. Informed pt going home on hospice, not candidate for txp or local treatment of hcc.

## 2019-08-27 ENCOUNTER — CONFERENCE (OUTPATIENT)
Dept: TRANSPLANT | Facility: CLINIC | Age: 59
End: 2019-08-27

## 2019-08-27 LAB
BACTERIA BLD CULT: NORMAL
BACTERIA BLD CULT: NORMAL

## 2019-08-27 NOTE — TELEPHONE ENCOUNTER
Patient: Jim John       MRN: [id number]      : 1960     Age: 59 y.o.  [address] [id number]     Provider: Hepatologist Salome Bar     Urgency of review: urgent     Patient Transplant Status: Not a candidate     Reason for presentation: Treatment plan     Clinical Summary:59 year old male with HCV who presents with worsening ascites and jaundice.  Symptoms have been present since February but became more acute and worse over last few days. History of anal squamous cell carcinoma.      Imaging to be reviewed: Abdominal CT scan     HCC Treatment History: None     ABO:      Platelets:         Lab Results   Component Value Date/Time      (L) 2019 05:59 AM      Creatinine:         Lab Results   Component Value Date/Time     CREATININE 0.7 2019 05:59 AM      Bilirubin:         Lab Results   Component Value Date/Time     BILITOT 21.6 (H) 2019 05:59 AM      AFP Last 3 each if available:         Lab Results   Component Value Date/Time     AFP 46183 (H) 2019 09:44 AM         MELD: MELD-Na score: 28 at 2019  5:59 AM  MELD score: 25 at 2019  5:59 AM  Calculated from:  Serum Creatinine: 0.7 mg/dL (Rounded to 1 mg/dL) at 2019  5:59 AM  Serum Sodium: 130 mmol/L at 2019  5:59 AM  Total Bilirubin: 21.6 mg/dL at 2019  5:59 AM  INR(ratio): 1.8 at 2019  5:59 AM  Age: 59 years     Plan: Needs MRI.  Cirrhosis with R lobe mass and infiltrative disease suspected in L lobe., incompletely characterized on single phase CT, almost certainly HCC with AFP >50K.     Patient discharged to home on hospice.  No treatment recommendations.     Follow-up Provider: Dr Bar

## 2019-08-29 LAB — BACTERIA SPEC ANAEROBE CULT: NORMAL

## 2019-09-04 LAB — PHOSPHATIDYLETHANOL (PETH): NEGATIVE NG/ML
